# Patient Record
Sex: FEMALE | Race: WHITE | Employment: OTHER | URBAN - NONMETROPOLITAN AREA
[De-identification: names, ages, dates, MRNs, and addresses within clinical notes are randomized per-mention and may not be internally consistent; named-entity substitution may affect disease eponyms.]

---

## 2022-01-08 ENCOUNTER — APPOINTMENT (OUTPATIENT)
Dept: GENERAL RADIOLOGY | Age: 67
DRG: 917 | End: 2022-01-08
Payer: MEDICARE

## 2022-01-08 ENCOUNTER — HOSPITAL ENCOUNTER (INPATIENT)
Age: 67
LOS: 5 days | Discharge: PSYCHIATRIC OR SUBSTANCE USE FACILITY | DRG: 917 | End: 2022-01-13
Attending: EMERGENCY MEDICINE | Admitting: INTERNAL MEDICINE
Payer: MEDICARE

## 2022-01-08 ENCOUNTER — APPOINTMENT (OUTPATIENT)
Dept: CT IMAGING | Age: 67
DRG: 917 | End: 2022-01-08
Payer: MEDICARE

## 2022-01-08 DIAGNOSIS — J96.01 ACUTE RESPIRATORY FAILURE WITH HYPOXIA (HCC): ICD-10-CM

## 2022-01-08 DIAGNOSIS — T50.904A DRUG OVERDOSE, UNDETERMINED INTENT, INITIAL ENCOUNTER: Primary | ICD-10-CM

## 2022-01-08 DIAGNOSIS — J69.0 ASPIRATION PNEUMONIA, UNSPECIFIED ASPIRATION PNEUMONIA TYPE, UNSPECIFIED LATERALITY, UNSPECIFIED PART OF LUNG (HCC): ICD-10-CM

## 2022-01-08 PROBLEM — T50.902A INTENTIONAL DRUG OVERDOSE (HCC): Status: ACTIVE | Noted: 2022-01-08

## 2022-01-08 LAB
ACETAMINOPHEN LEVEL: <15 UG/ML
ACETAMINOPHEN LEVEL: <15 UG/ML
ALBUMIN SERPL-MCNC: 4.2 G/DL (ref 3.5–5.2)
ALP BLD-CCNC: 69 U/L (ref 35–104)
ALT SERPL-CCNC: 16 U/L (ref 5–33)
AMPHETAMINE SCREEN, URINE: NEGATIVE
ANION GAP SERPL CALCULATED.3IONS-SCNC: 12 MMOL/L (ref 7–19)
AST SERPL-CCNC: 29 U/L (ref 5–32)
BARBITURATE SCREEN URINE: NEGATIVE
BASE EXCESS ARTERIAL: 1.5 MMOL/L (ref -2–2)
BENZODIAZEPINE SCREEN, URINE: POSITIVE
BILIRUB SERPL-MCNC: 0.4 MG/DL (ref 0.2–1.2)
BILIRUBIN URINE: NEGATIVE
BLOOD, URINE: NEGATIVE
BUN BLDV-MCNC: 15 MG/DL (ref 8–23)
CALCIUM SERPL-MCNC: 10.1 MG/DL (ref 8.8–10.2)
CANNABINOID SCREEN URINE: NEGATIVE
CARBOXYHEMOGLOBIN ARTERIAL: 2.3 % (ref 0–5)
CHLORIDE BLD-SCNC: 105 MMOL/L (ref 98–111)
CLARITY: CLEAR
CO2: 25 MMOL/L (ref 22–29)
COCAINE METABOLITE SCREEN URINE: NEGATIVE
COLOR: ABNORMAL
CREAT SERPL-MCNC: 1 MG/DL (ref 0.5–0.9)
ETHANOL: <10 MG/DL (ref 0–0.08)
GFR AFRICAN AMERICAN: >59
GFR NON-AFRICAN AMERICAN: 56
GLUCOSE BLD-MCNC: 116 MG/DL (ref 70–99)
GLUCOSE BLD-MCNC: 139 MG/DL (ref 74–109)
GLUCOSE URINE: NEGATIVE MG/DL
HCO3 ARTERIAL: 25.9 MMOL/L (ref 22–26)
HCT VFR BLD CALC: 44.2 % (ref 37–47)
HEMOGLOBIN, ART, EXTENDED: 13.1 G/DL (ref 12–16)
HEMOGLOBIN: 13.9 G/DL (ref 12–16)
KETONES, URINE: ABNORMAL MG/DL
LEUKOCYTE ESTERASE, URINE: NEGATIVE
Lab: ABNORMAL
MCH RBC QN AUTO: 30.2 PG (ref 27–31)
MCHC RBC AUTO-ENTMCNC: 31.4 G/DL (ref 33–37)
MCV RBC AUTO: 96.1 FL (ref 81–99)
METHEMOGLOBIN ARTERIAL: 0.7 %
NITRITE, URINE: NEGATIVE
O2 CONTENT ARTERIAL: 16.9 ML/DL
O2 SAT, ARTERIAL: 92 %
O2 THERAPY: ABNORMAL
OPIATE SCREEN URINE: POSITIVE
PCO2 ARTERIAL: 39 MMHG (ref 35–45)
PDW BLD-RTO: 13.5 % (ref 11.5–14.5)
PERFORMED ON: ABNORMAL
PH ARTERIAL: 7.43 (ref 7.35–7.45)
PH UA: 5 (ref 5–8)
PLATELET # BLD: 275 K/UL (ref 130–400)
PMV BLD AUTO: 10.1 FL (ref 9.4–12.3)
PO2 ARTERIAL: 56 MMHG (ref 80–100)
POTASSIUM SERPL-SCNC: 4.5 MMOL/L (ref 3.5–5)
POTASSIUM SERPL-SCNC: 6.1 MMOL/L (ref 3.5–5)
POTASSIUM, WHOLE BLOOD: 4.6
PROTEIN UA: ABNORMAL MG/DL
RBC # BLD: 4.6 M/UL (ref 4.2–5.4)
SALICYLATE, SERUM: <3 MG/DL (ref 3–10)
SARS-COV-2, NAAT: NOT DETECTED
SODIUM BLD-SCNC: 142 MMOL/L (ref 136–145)
SPECIFIC GRAVITY UA: 1.04 (ref 1–1.03)
TOTAL PROTEIN: 6.9 G/DL (ref 6.6–8.7)
TROPONIN: <0.01 NG/ML (ref 0–0.03)
UROBILINOGEN, URINE: 0.2 E.U./DL
WBC # BLD: 12.5 K/UL (ref 4.8–10.8)

## 2022-01-08 PROCEDURE — 80307 DRUG TEST PRSMV CHEM ANLYZR: CPT

## 2022-01-08 PROCEDURE — 6360000002 HC RX W HCPCS: Performed by: INTERNAL MEDICINE

## 2022-01-08 PROCEDURE — 2000000000 HC ICU R&B

## 2022-01-08 PROCEDURE — 0BH17EZ INSERTION OF ENDOTRACHEAL AIRWAY INTO TRACHEA, VIA NATURAL OR ARTIFICIAL OPENING: ICD-10-PCS | Performed by: EMERGENCY MEDICINE

## 2022-01-08 PROCEDURE — 6360000002 HC RX W HCPCS

## 2022-01-08 PROCEDURE — 99285 EMERGENCY DEPT VISIT HI MDM: CPT

## 2022-01-08 PROCEDURE — 36600 WITHDRAWAL OF ARTERIAL BLOOD: CPT

## 2022-01-08 PROCEDURE — 82947 ASSAY GLUCOSE BLOOD QUANT: CPT

## 2022-01-08 PROCEDURE — 93005 ELECTROCARDIOGRAM TRACING: CPT | Performed by: EMERGENCY MEDICINE

## 2022-01-08 PROCEDURE — 82803 BLOOD GASES ANY COMBINATION: CPT

## 2022-01-08 PROCEDURE — 5A1945Z RESPIRATORY VENTILATION, 24-96 CONSECUTIVE HOURS: ICD-10-PCS | Performed by: EMERGENCY MEDICINE

## 2022-01-08 PROCEDURE — 2580000003 HC RX 258: Performed by: EMERGENCY MEDICINE

## 2022-01-08 PROCEDURE — 6370000000 HC RX 637 (ALT 250 FOR IP): Performed by: INTERNAL MEDICINE

## 2022-01-08 PROCEDURE — 80053 COMPREHEN METABOLIC PANEL: CPT

## 2022-01-08 PROCEDURE — 84484 ASSAY OF TROPONIN QUANT: CPT

## 2022-01-08 PROCEDURE — 2500000003 HC RX 250 WO HCPCS: Performed by: EMERGENCY MEDICINE

## 2022-01-08 PROCEDURE — 81003 URINALYSIS AUTO W/O SCOPE: CPT

## 2022-01-08 PROCEDURE — 84132 ASSAY OF SERUM POTASSIUM: CPT

## 2022-01-08 PROCEDURE — 87635 SARS-COV-2 COVID-19 AMP PRB: CPT

## 2022-01-08 PROCEDURE — 71045 X-RAY EXAM CHEST 1 VIEW: CPT

## 2022-01-08 PROCEDURE — 51702 INSERT TEMP BLADDER CATH: CPT

## 2022-01-08 PROCEDURE — 80179 DRUG ASSAY SALICYLATE: CPT

## 2022-01-08 PROCEDURE — 96375 TX/PRO/DX INJ NEW DRUG ADDON: CPT

## 2022-01-08 PROCEDURE — 36415 COLL VENOUS BLD VENIPUNCTURE: CPT

## 2022-01-08 PROCEDURE — 87040 BLOOD CULTURE FOR BACTERIA: CPT

## 2022-01-08 PROCEDURE — 31500 INSERT EMERGENCY AIRWAY: CPT

## 2022-01-08 PROCEDURE — 96365 THER/PROPH/DIAG IV INF INIT: CPT

## 2022-01-08 PROCEDURE — 2700000000 HC OXYGEN THERAPY PER DAY

## 2022-01-08 PROCEDURE — 96376 TX/PRO/DX INJ SAME DRUG ADON: CPT

## 2022-01-08 PROCEDURE — 6360000002 HC RX W HCPCS: Performed by: EMERGENCY MEDICINE

## 2022-01-08 PROCEDURE — 70450 CT HEAD/BRAIN W/O DYE: CPT

## 2022-01-08 PROCEDURE — 85027 COMPLETE CBC AUTOMATED: CPT

## 2022-01-08 PROCEDURE — 2580000003 HC RX 258: Performed by: INTERNAL MEDICINE

## 2022-01-08 PROCEDURE — 94002 VENT MGMT INPAT INIT DAY: CPT

## 2022-01-08 PROCEDURE — 82077 ASSAY SPEC XCP UR&BREATH IA: CPT

## 2022-01-08 PROCEDURE — 80143 DRUG ASSAY ACETAMINOPHEN: CPT

## 2022-01-08 PROCEDURE — 2500000003 HC RX 250 WO HCPCS: Performed by: INTERNAL MEDICINE

## 2022-01-08 RX ORDER — CHLORHEXIDINE GLUCONATE 0.12 MG/ML
15 RINSE ORAL 2 TIMES DAILY
Status: DISCONTINUED | OUTPATIENT
Start: 2022-01-08 | End: 2022-01-13 | Stop reason: HOSPADM

## 2022-01-08 RX ORDER — PROPOFOL 10 MG/ML
INJECTION, EMULSION INTRAVENOUS
Status: COMPLETED
Start: 2022-01-08 | End: 2022-01-08

## 2022-01-08 RX ORDER — ONDANSETRON 4 MG/1
4 TABLET, ORALLY DISINTEGRATING ORAL EVERY 8 HOURS PRN
Status: DISCONTINUED | OUTPATIENT
Start: 2022-01-08 | End: 2022-01-13 | Stop reason: HOSPADM

## 2022-01-08 RX ORDER — NALOXONE HYDROCHLORIDE 0.4 MG/ML
0.4 INJECTION, SOLUTION INTRAMUSCULAR; INTRAVENOUS; SUBCUTANEOUS ONCE
Status: COMPLETED | OUTPATIENT
Start: 2022-01-08 | End: 2022-01-08

## 2022-01-08 RX ORDER — SODIUM CHLORIDE 9 MG/ML
25 INJECTION, SOLUTION INTRAVENOUS PRN
Status: DISCONTINUED | OUTPATIENT
Start: 2022-01-08 | End: 2022-01-13 | Stop reason: HOSPADM

## 2022-01-08 RX ORDER — PROPOFOL 10 MG/ML
INJECTION, EMULSION INTRAVENOUS
Status: DISPENSED
Start: 2022-01-08 | End: 2022-01-08

## 2022-01-08 RX ORDER — SODIUM CHLORIDE 0.9 % (FLUSH) 0.9 %
5-40 SYRINGE (ML) INJECTION PRN
Status: DISCONTINUED | OUTPATIENT
Start: 2022-01-08 | End: 2022-01-13 | Stop reason: HOSPADM

## 2022-01-08 RX ORDER — NALOXONE HYDROCHLORIDE 0.4 MG/ML
INJECTION, SOLUTION INTRAMUSCULAR; INTRAVENOUS; SUBCUTANEOUS
Status: COMPLETED
Start: 2022-01-08 | End: 2022-01-08

## 2022-01-08 RX ORDER — ONDANSETRON 2 MG/ML
4 INJECTION INTRAMUSCULAR; INTRAVENOUS EVERY 6 HOURS PRN
Status: DISCONTINUED | OUTPATIENT
Start: 2022-01-08 | End: 2022-01-13 | Stop reason: HOSPADM

## 2022-01-08 RX ORDER — PROPOFOL 10 MG/ML
5-50 INJECTION, EMULSION INTRAVENOUS ONCE
Status: COMPLETED | OUTPATIENT
Start: 2022-01-08 | End: 2022-01-08

## 2022-01-08 RX ORDER — PROPOFOL 10 MG/ML
5-50 INJECTION, EMULSION INTRAVENOUS
Status: DISCONTINUED | OUTPATIENT
Start: 2022-01-08 | End: 2022-01-10

## 2022-01-08 RX ORDER — 0.9 % SODIUM CHLORIDE 0.9 %
1000 INTRAVENOUS SOLUTION INTRAVENOUS ONCE
Status: COMPLETED | OUTPATIENT
Start: 2022-01-08 | End: 2022-01-08

## 2022-01-08 RX ORDER — SODIUM CHLORIDE 0.9 % (FLUSH) 0.9 %
5-40 SYRINGE (ML) INJECTION EVERY 12 HOURS SCHEDULED
Status: DISCONTINUED | OUTPATIENT
Start: 2022-01-08 | End: 2022-01-13 | Stop reason: HOSPADM

## 2022-01-08 RX ADMIN — FAMOTIDINE 20 MG: 10 INJECTION, SOLUTION INTRAVENOUS at 20:04

## 2022-01-08 RX ADMIN — METRONIDAZOLE 500 MG: 500 SOLUTION INTRAVENOUS at 08:09

## 2022-01-08 RX ADMIN — PROPOFOL 10 MCG/KG/MIN: 10 INJECTION, EMULSION INTRAVENOUS at 19:42

## 2022-01-08 RX ADMIN — PROPOFOL 5 ML/HR: 10 INJECTION, EMULSION INTRAVENOUS at 09:13

## 2022-01-08 RX ADMIN — NALOXONE HYDROCHLORIDE 0.4 MG: 0.4 INJECTION, SOLUTION INTRAMUSCULAR; INTRAVENOUS; SUBCUTANEOUS at 07:30

## 2022-01-08 RX ADMIN — SODIUM CHLORIDE, PRESERVATIVE FREE 10 ML: 5 INJECTION INTRAVENOUS at 20:04

## 2022-01-08 RX ADMIN — FAMOTIDINE 20 MG: 10 INJECTION, SOLUTION INTRAVENOUS at 11:10

## 2022-01-08 RX ADMIN — PROPOFOL 10 MCG/KG/MIN: 10 INJECTION, EMULSION INTRAVENOUS at 11:12

## 2022-01-08 RX ADMIN — AZITHROMYCIN MONOHYDRATE 500 MG: 500 INJECTION, POWDER, LYOPHILIZED, FOR SOLUTION INTRAVENOUS at 09:15

## 2022-01-08 RX ADMIN — NALXONE HYDROCHLORIDE 0.4 MG: 0.4 INJECTION INTRAMUSCULAR; INTRAVENOUS; SUBCUTANEOUS at 06:38

## 2022-01-08 RX ADMIN — NALOXONE HYDROCHLORIDE 0.4 MG: 0.4 INJECTION, SOLUTION INTRAMUSCULAR; INTRAVENOUS; SUBCUTANEOUS at 06:38

## 2022-01-08 RX ADMIN — WATER 1000 MG: 1 INJECTION INTRAMUSCULAR; INTRAVENOUS; SUBCUTANEOUS at 08:08

## 2022-01-08 RX ADMIN — CHLORHEXIDINE GLUCONATE 15 ML: 1.2 RINSE ORAL at 20:00

## 2022-01-08 RX ADMIN — ENOXAPARIN SODIUM 40 MG: 100 INJECTION SUBCUTANEOUS at 11:10

## 2022-01-08 RX ADMIN — NALXONE HYDROCHLORIDE 0.4 MG: 0.4 INJECTION INTRAMUSCULAR; INTRAVENOUS; SUBCUTANEOUS at 07:30

## 2022-01-08 RX ADMIN — SODIUM CHLORIDE 1000 ML: 9 INJECTION, SOLUTION INTRAVENOUS at 06:47

## 2022-01-08 ASSESSMENT — PULMONARY FUNCTION TESTS
PIF_VALUE: 18
PIF_VALUE: 17
PIF_VALUE: 21
PIF_VALUE: 19
PIF_VALUE: 18
PIF_VALUE: 18
PIF_VALUE: 22
PIF_VALUE: 18
PIF_VALUE: 0
PIF_VALUE: 29
PIF_VALUE: 20
PIF_VALUE: 20
PIF_VALUE: 18
PIF_VALUE: 20
PIF_VALUE: 21
PIF_VALUE: 29
PIF_VALUE: 18
PIF_VALUE: 19
PIF_VALUE: 24
PIF_VALUE: 21
PIF_VALUE: 21

## 2022-01-08 NOTE — ED NOTES
Dr. Latrell Golden at bedside, as well as Shannan READ, Fernando Iverson, Juan Carlos Bishop, and Suzie LEDBETTER , as well as this RN         Pankaj Riley, RN  01/08/22 Renny Walker, RN  01/08/22 Renny Walker, RN  01/08/22 786

## 2022-01-08 NOTE — PROGRESS NOTES
Contains abnormal data BLOOD GAS, ARTERIAL     Status: Final result    Component Ref Range & Units 01/08/22 0720   pH, Arterial 7.350 - 7.450 7.430    pCO2, Arterial 35.0 - 45.0 mmHg 39.0    pO2, Arterial 80.0 - 100.0 mmHg 56. 0 Low     HCO3, Arterial 22.0 - 26.0 mmol/L 25.9    Base Excess, Arterial -2.0 - 2.0 mmol/L 1.5    Hemoglobin, Art, Extended 12.0 - 16.0 g/dL 13.1    O2 Sat, Arterial >92 % 92.0    Carboxyhgb, Arterial 0.0 - 5.0 % 2.3    Comment:      0.0-1.5   (Smokers 1.5-5.0)    Methemoglobin, Arterial <1.5 % 0.7    O2 Content, Arterial Not Established mL/dL 16.9    O2 Therapy  Unknown    Resulting Agency  [facility]        Specimen Collected: 01/08/22 0720 Last Resulted: 01/08/22 0721 View Other Order Details        Pt on 50% vmx for approx 5 min.   RR 24 site RB

## 2022-01-08 NOTE — ED NOTES
Remaining medication counted and taken with pt ICU for lock up. # 52 small, round, white pills. EP/205 on back. Security and pharm report they no longer secure pt's medications.      Cayla Graves RN  01/08/22 4269

## 2022-01-08 NOTE — PROGRESS NOTES
Pt intubated with 7.0 etube at 24 cm violet per . Pt placed on vent at AC/VC 15 450 100% 5 peep at 0849.  CXR and ABG's pending

## 2022-01-08 NOTE — ED NOTES
0844 etomidate 20 mg iv R wrist per stone liu    0845 succ 100 mg iv R wrist per stone liu    0845 pt hyperoxygenated via BVM, bagging easily, sat 97    0847 pt intubated by dr Brittni Cerrato, assisted by scotty dumont, with ETT 7, 24 at the lip. Bilateral breath sounds auscultated, good color change, sat 100, confirmed with portcxr.          Mata Rothman RN  01/08/22 2651

## 2022-01-08 NOTE — ED NOTES
Pt placed on Venti mask at 50% o2 at 10L - O2 sat up to 93%     Pankaj Riley, RN  01/08/22 2000 Britany Marshall RN  01/08/22 0522

## 2022-01-08 NOTE — ED NOTES
OFF nonrebreather per MD request, pt sats 82% W/I roughly 5 seconds         [facility], ANATOLY  01/08/22 [address] [place], [address] [place] [address]  01/08/22 3493

## 2022-01-08 NOTE — ED NOTES
Pt placed in Soft restraints to protect airway, pt pulling lines and tubes without restraints       ANATOLY Ruiz  01/08/22 4698

## 2022-01-08 NOTE — ED NOTES
EMS brought an unmarked bottle containing Ativan 1mg , possible overdose from this medicine - Ativan 1mg confirmed by Dr at [facility]       [facility]  01/08/22 [address] [facility],[address], [facility]  01/08/22 9292

## 2022-01-08 NOTE — ED NOTES
Dr Shelbie Faye, scotty rt, alexa rn, and mel rn at bedside for intubation.      Verena Sellers, ANATOLY  01/08/22 9962

## 2022-01-08 NOTE — CONSULTS
Comprehensive Nutrition Assessment    Type and Reason for Visit:  Initial,Consult    Nutrition Recommendations/Plan: start Vital High Protein at 15ml/hr and advance by 10ml every 6 hours until goal rate of 57ml is reached. Nutrition Assessment:  Pt appears adequately nourished AEB fat and muscle mass. Received consult for tf orders and management. Malnutrition Assessment:  Malnutrition Status:   At risk for malnutrition (Comment)    Context:  Acute Illness     Findings of the 6 clinical characteristics of malnutrition:  Energy Intake:  Mild decrease in energy intake (Comment)  Weight Loss:  No significant weight loss     Body Fat Loss:  No significant body fat loss     Muscle Mass Loss:  No significant muscle mass loss    Fluid Accumulation:  No significant fluid accumulation     Strength:  Not Performed    Estimated Daily Nutrient Needs:  Energy (kcal):  414-6024 kcls; Weight Used for Energy Requirements:  Admission     Protein (g):  118g; Weight Used for Protein Requirements:  Ideal        Fluid (ml/day):  997-1522 ml; Method Used for Fluid Requirements:  1 ml/kcal      Nutrition Related Findings:  on vent      Wounds:  None       Current Nutrition Therapies:    Current Tube Feeding (TF) Orders:  · Feeding Route: Orogastric  · Formula: Peptide Based High Protein  · Schedule: Continuous  · Additives/Modulars:    · Water Flushes: 10ml  · Current TF & Flush Orders Provides: 0  · Goal TF & Flush Orders Provides: Vital High Protein @ goal rate of 56ml/hr with 10ml free water flush = 1344 kcals with 117g protein, 149g CHO and 1363ml free water      Anthropometric Measures:  · Height: 5' 6\" (167.6 cm)  · Current Body Weight: 200 lb (90.7 kg)   · Admission Body Weight: 200 lb (90.7 kg)    · Usual Body Weight: 202 lb (91.6 kg) (10/2021)     · Ideal Body Weight: 130 lbs; % Ideal Body Weight 153.8 %   · BMI: 32.3  · Adjusted Body Weight:  ; No Adjustment   · Adjusted BMI:      · BMI Categories: Obese Class 1 (BMI 30.0-34. 9)       Nutrition Diagnosis:   · Inadequate oral intake related to acute injury/trauma,impaired respiratory function as evidenced by NPO or clear liquid status due to medical condition,intubation,nutrition support - enteral nutrition      Nutrition Interventions:   Food and/or Nutrient Delivery:  Start Tube Feeding  Nutrition Education/Counseling:  No recommendation at this time   Coordination of Nutrition Care:  Continue to monitor while inpatient    Goals:  meet nutritional needs through EN       Nutrition Monitoring and Evaluation:   Behavioral-Environmental Outcomes:  None Identified   Food/Nutrient Intake Outcomes:  Enteral Nutrition Intake/Tolerance  Physical Signs/Symptoms Outcomes:  Biochemical Data,Weight,Skin,Nutrition Focused Physical Findings,Fluid Status or Edema     Discharge Planning:     Too soon to determine     Electronically signed by Anastasia Huang MS, RD, LD on 1/8/22 at 2:56 PM CST    Contact: [phone]

## 2022-01-08 NOTE — ED PROVIDER NOTES
[facility] ICU  eMERGENCY dEPARTMENT eNCOUnter      Pt Name: Maya Shaikh  MRN: [id number]  [place] 1955  Date of evaluation: 1/8/2022  Provider: Sunita Verdin MD    CHIEF COMPLAINT       Chief Complaint   Patient presents with    Drug Overdose     reported ativan by EMS - called by          HISTORY OF PRESENT ILLNESS   (Location/Symptom, Timing/Onset,Context/Setting, Quality, Duration, Modifying Factors, Severity)  Note limiting factors. Maya Shaikh is a 73 y.o. female who presents to the emergency department due to suspected overdose. Report is that patient was normal last night and this morning  went to check on her and she was minimally responsive. There was a bottle of Ativan at the bedside that has been half-full and is now nearly empty. No further history available at this time. Patient's drowsy. Moans and screams anytime we try to examine her. Waiting for  to arrive for more information. HPI    NursingNotes were reviewed. REVIEW OF SYSTEMS    (2-9 systems for level 4, 10 or more for level 5)     Review of Systems   Unable to perform ROS: Mental status change       A complete review of systems was performed and is negative except as noted above in the HPI. PAST MEDICAL HISTORY   No past medical history on file. SURGICAL HISTORY     No past surgical history on file. CURRENT MEDICATIONS       There are no discharge medications for this patient. ALLERGIES     Patient has no allergy information on record. FAMILY HISTORY     No family history on file.        SOCIAL HISTORY       Social History     Socioeconomic History    Marital status:      Spouse name: Not on file    Number of children: Not on file    Years of education: Not on file    Highest education level: Not on file   Occupational History    Not on file   Tobacco Use    Smoking status: Not on file    Smokeless tobacco: Not on file   Substance and Sexual Activity    Alcohol use: Not on file    Drug use: Not on file    Sexual activity: Not on file   Other Topics Concern    Not on file   Social History Narrative    Not on file     Social Determinants of Health     Financial Resource Strain:     Difficulty of Paying Living Expenses: Not on file   Food Insecurity:     Worried About Running Out of Food in the Last Year: Not on file    Rosy of Food in the Last Year: Not on file   Transportation Needs:     Lack of Transportation (Medical): Not on file    Lack of Transportation (Non-Medical): Not on file   Physical Activity:     Days of Exercise per Week: Not on file    Minutes of Exercise per Session: Not on file   Stress:     Feeling of Stress : Not on file   Social Connections:     Frequency of Communication with Friends and Family: Not on file    Frequency of Social Gatherings with Friends and Family: Not on file    Attends Yazdanism Services: Not on file    Active Member of [address] [place] [address] [employer] or Organizations: Not on file    Attends Club or Organization Meetings: Not on file    Marital Status: Not on file   Intimate Partner Violence:     Fear of Current or Ex-Partner: Not on file    Emotionally Abused: Not on file    Physically Abused: Not on file    Sexually Abused: Not on file   Housing Stability:     Unable to Pay for Housing in the Last Year: Not on file    Number of [place] in the Last Year: Not on file    Unstable Housing in the Last Year: Not on file       SCREENINGS    [place] Coma Scale  Eye Opening: To pain  Best Verbal Response: None  Best Motor Response: Withdraws from pain  [place] Coma Scale Score: 7        PHYSICAL EXAM    (up to 7 for level 4, 8 or more for level 5)     ED Triage Vitals [01/08/22 0635]   BP Temp Temp Source Pulse Resp SpO2 Height Weight   (!) 69/49 98.4 °F (36.9 °C) Oral 73 29 95 % -- --       Physical Exam  Vitals reviewed. Constitutional:       Appearance: She is well-developed. She is not toxic-appearing.    HENT:      Head: Normocephalic and atraumatic. Right Ear: Tympanic membrane, ear canal and external ear normal.      Left Ear: Tympanic membrane, ear canal and external ear normal.      Mouth/Throat:      Mouth: Mucous membranes are moist.   Eyes:      General: No scleral icterus. Conjunctiva/sclera: Conjunctivae normal.      Pupils: Pupils are equal, round, and reactive to light. Neck:      Vascular: No JVD. Cardiovascular:      Rate and Rhythm: Normal rate and regular rhythm. Heart sounds: Normal heart sounds. Pulmonary:      Effort: Pulmonary effort is normal. No respiratory distress. Breath sounds: Normal breath sounds. Abdominal:      General: There is no distension. Palpations: Abdomen is soft. Tenderness: There is no abdominal tenderness. There is no guarding or rebound. Musculoskeletal:         General: No tenderness. Cervical back: Normal range of motion and neck supple. Skin:     General: Skin is warm and dry. Neurological:      Mental Status: She is easily aroused. She is lethargic. Comments: Limited exam but is moving all 4 extremities equally. Patient very restless in the bed and agitated but is also fairly lethargic. She is protecting airway adequately at this time. Psychiatric:      Comments: Unable to assess at this time         DIAGNOSTIC RESULTS     EKG: All EKG's are interpreted by the Emergency Department Physician who either signs or Co-signs this chart in the absence of a cardiologist.    Normal sinus rhythm. Artifact. Normal QT. Borderline T wave changes. RADIOLOGY:   Non-plain film images such as CT, Ultrasound and MRI are read by the radiologist. Salud Sax images are visualized and preliminarily interpreted by the emergency physician with the below findings:      Interpretation per the Radiologist below, if available at the time of this note:    XR CHEST PORTABLE   Final Result   1. Endotracheal tube is 1 cm proximal to the joão   2. Groundglass opacity right lung base slightly increased from prior   exam of the same date. Signed by Dr Jay Negro   Final Result   Mild cerebral and cerebellar volume loss with chronic microvascular   disease but no evidence of acute intracranial process. Signed by Dr Radha Mayfield   Final Result   1. Vague groundglass density right cardiophrenic angle. This may be   either an early inflammatory process or possibly atelectasis. .   Signed by Dr Deangelo Miner            ED BEDSIDE ULTRASOUND:   Performed by ED Physician - none    LABS:  Labs Reviewed   BLOOD GAS, ARTERIAL - Abnormal; Notable for the following components:       Result Value    pO2, Arterial 56.0 (*)     All other components within normal limits   CBC - Abnormal; Notable for the following components:    WBC 12.5 (*)     MCHC 31.4 (*)     All other components within normal limits   COMPREHENSIVE METABOLIC PANEL - Abnormal; Notable for the following components:    Potassium 6.1 (*)     Glucose 139 (*)     CREATININE 1.0 (*)     GFR Non- 56 (*)     All other components within normal limits    Narrative:     CALL  [place]  [facility] tel. ,  Chemistry results called to and read back by lui ritter er, 01/08/2022 07:39, by  [facility]   POCT GLUCOSE - Abnormal; Notable for the following components:    POC Glucose 116 (*)     All other components within normal limits   COVID-19, RAPID   CULTURE, BLOOD 2   CULTURE, BLOOD 1   ACETAMINOPHEN LEVEL   ETHANOL   URINE DRUG SCREEN   SALICYLATE LEVEL   TROPONIN   POTASSIUM, WHOLE BLOOD   POTASSIUM   URINALYSIS   ACETAMINOPHEN LEVEL       All other labs were within normal range or not returned as of this dictation.     EMERGENCY DEPARTMENT COURSE and DIFFERENTIALDIAGNOSIS/MDM:   Vitals:    Vitals:    01/08/22 0842 01/08/22 0843 01/08/22 0904 01/08/22 0945   BP:  (!) 164/77  135/87   Pulse:  77 76 71   Resp:   19 29   Temp:    98.5 °F (36.9 °C)   TempSrc:    Temporal SpO2:  97% 100% 100%   Weight: 200 lb (90.7 kg)      Height:    5' 6\" (1.676 m)       MDM  Number of Diagnoses or Management Options  Critical Care  Total time providing critical care: 30-74 minutes    Patient Progress  Patient progress: stable    Given Narcan soon after arrival and seems maybe a little bit more awake and agitated. O2 sats were low 80s on room air. 99% on 50% Ventimask. Initial BP 69/49 the patient was moving quite a bit. Repeat blood pressure 142/85. Patient's  is here now. He said that his wife had been upset last night about some family issues. He said that she had voiced suicidal ideation. He said that there was a bottle of Ativan that has quite a few more pills in the last night than it does this morning so he feels pretty confident she overdosed on these throughout the evening. No other ingestions he is aware of. He denies any history of her having other suicide attempts. Case discussed with Dr. Rhea Manuel, ICU hospitalist.  He is going to see the patient in the ER. Patient seems to protecting her airway adequately at this time. Could make a case to intubate but seems like she is probably okay to hold off on intubation for now. Does not seem like the Narcan really made any difference. Dr. Rhea Manuel is going to come and see the patient in the ER and then will decide whether or not he wants me to intubate her prior to her being admitted to the ICU. Before Dr. Rhea Manuel was able to come down patient seemed to become more sonorous and did vomit once. Because of this felt like intubation needed to prevent aspiration. See procedure note for details. Dr. Rhea Manuel in ER seeing patient and will admit to ICU.  updated about plan.     CONSULTS:  IP CONSULT TO DIETITIAN    PROCEDURES:  Unless otherwise notedbelow, none     Intubation    Date/Time: 1/8/2022 8:50 AM  Performed by: Marilu Bradford MD  Authorized by: Marilu Bradford MD     Consent: Consent obtained:  Emergent situation    Consent given by:  Spouse  Pre-procedure details:     Patient status:  Altered mental status    Mallampati score:  II    Pretreatment medications:  None    Paralytics:  Succinylcholine  Procedure details:     Preoxygenation:  Bag valve mask    CPR in progress: no      Intubation method:  Oral    Oral intubation technique:  Direct    Laryngoscope blade: Mac 4    Tube size (mm):  7.0    Tube type:  Cuffed    Number of attempts:  1    Cricoid pressure: yes      Tube visualized through cords: yes    Placement assessment:     ETT to lip:  23    Tube secured with:  ETT piña    Breath sounds:  Equal and absent over the epigastrium    Placement verification: chest rise, condensation, CXR verification, equal breath sounds, ETCO2 detector and tube exhalation      CXR findings:  ETT in proper place  Post-procedure details:     Patient tolerance of procedure: Tolerated well, no immediate complications        FINAL IMPRESSION     1. Drug overdose, undetermined intent, initial encounter    2. Aspiration pneumonia, unspecified aspiration pneumonia type, unspecified laterality, unspecified part of lung ([facility] [address]    3. Acute respiratory failure with hypoxia ([facility] [address]          DISPOSITION/PLAN   DISPOSITION Admitted 01/08/2022 08:45:36 AM      PATIENT REFERRED TO:  @FUP@    DISCHARGE MEDICATIONS:  There are no discharge medications for this patient.          (Please note that portions of this note were completed with a voice recognition program.  Efforts were made to edit the dictations butoccasionally words are mis-transcribed.)    Marilu Bradford MD (electronically signed)  AttendingEmergency Physician         Marilu Bradford MD  01/08/22 6115

## 2022-01-08 NOTE — ED NOTES
BG obtained at [address] [place] [address] , [facility]  01/08/22 8477

## 2022-01-09 LAB
ALBUMIN SERPL-MCNC: 3.3 G/DL (ref 3.5–5.2)
ALP BLD-CCNC: 57 U/L (ref 35–104)
ALT SERPL-CCNC: 10 U/L (ref 5–33)
ANION GAP SERPL CALCULATED.3IONS-SCNC: 10 MMOL/L (ref 7–19)
AST SERPL-CCNC: 11 U/L (ref 5–32)
BILIRUB SERPL-MCNC: 0.3 MG/DL (ref 0.2–1.2)
BUN BLDV-MCNC: 13 MG/DL (ref 8–23)
CALCIUM SERPL-MCNC: 8.7 MG/DL (ref 8.8–10.2)
CHLORIDE BLD-SCNC: 106 MMOL/L (ref 98–111)
CO2: 24 MMOL/L (ref 22–29)
CREAT SERPL-MCNC: 0.8 MG/DL (ref 0.5–0.9)
GFR AFRICAN AMERICAN: >59
GFR NON-AFRICAN AMERICAN: >60
GLUCOSE BLD-MCNC: 98 MG/DL (ref 74–109)
HCT VFR BLD CALC: 35.5 % (ref 37–47)
HEMOGLOBIN: 11.4 G/DL (ref 12–16)
MCH RBC QN AUTO: 30.2 PG (ref 27–31)
MCHC RBC AUTO-ENTMCNC: 32.1 G/DL (ref 33–37)
MCV RBC AUTO: 94.2 FL (ref 81–99)
PDW BLD-RTO: 13.3 % (ref 11.5–14.5)
PLATELET # BLD: 238 K/UL (ref 130–400)
PMV BLD AUTO: 10.5 FL (ref 9.4–12.3)
POTASSIUM REFLEX MAGNESIUM: 3.7 MMOL/L (ref 3.5–5)
RBC # BLD: 3.77 M/UL (ref 4.2–5.4)
SODIUM BLD-SCNC: 140 MMOL/L (ref 136–145)
TOTAL PROTEIN: 5.7 G/DL (ref 6.6–8.7)
WBC # BLD: 10.4 K/UL (ref 4.8–10.8)

## 2022-01-09 PROCEDURE — 2580000003 HC RX 258: Performed by: INTERNAL MEDICINE

## 2022-01-09 PROCEDURE — 2700000000 HC OXYGEN THERAPY PER DAY

## 2022-01-09 PROCEDURE — 80053 COMPREHEN METABOLIC PANEL: CPT

## 2022-01-09 PROCEDURE — 94003 VENT MGMT INPAT SUBQ DAY: CPT

## 2022-01-09 PROCEDURE — 2000000000 HC ICU R&B

## 2022-01-09 PROCEDURE — 2500000003 HC RX 250 WO HCPCS: Performed by: INTERNAL MEDICINE

## 2022-01-09 PROCEDURE — 94002 VENT MGMT INPAT INIT DAY: CPT

## 2022-01-09 PROCEDURE — 36415 COLL VENOUS BLD VENIPUNCTURE: CPT

## 2022-01-09 PROCEDURE — 85027 COMPLETE CBC AUTOMATED: CPT

## 2022-01-09 PROCEDURE — 6360000002 HC RX W HCPCS: Performed by: INTERNAL MEDICINE

## 2022-01-09 RX ADMIN — FAMOTIDINE 20 MG: 10 INJECTION, SOLUTION INTRAVENOUS at 08:54

## 2022-01-09 RX ADMIN — SODIUM CHLORIDE, PRESERVATIVE FREE 10 ML: 5 INJECTION INTRAVENOUS at 20:59

## 2022-01-09 RX ADMIN — ENOXAPARIN SODIUM 40 MG: 100 INJECTION SUBCUTANEOUS at 09:00

## 2022-01-09 RX ADMIN — CHLORHEXIDINE GLUCONATE 15 ML: 1.2 RINSE ORAL at 08:54

## 2022-01-09 RX ADMIN — FAMOTIDINE 20 MG: 10 INJECTION, SOLUTION INTRAVENOUS at 20:59

## 2022-01-09 ASSESSMENT — PULMONARY FUNCTION TESTS
PIF_VALUE: 20
PIF_VALUE: 21
PIF_VALUE: 19
PIF_VALUE: 23
PIF_VALUE: 22
PIF_VALUE: 22
PIF_VALUE: 20
PIF_VALUE: 21
PIF_VALUE: 20
PIF_VALUE: 21
PIF_VALUE: 22
PIF_VALUE: 23
PIF_VALUE: 21

## 2022-01-09 NOTE — PROGRESS NOTES
The patient's daughter, Edward Alexis, called to get an update on her condition. She said she would find a way to have the patient's living will/medical information and medications faxed to the ICU tomorrow.

## 2022-01-09 NOTE — PROGRESS NOTES
Hospitalist Progress Note    Patient:  Shayna Murry  YOB: 1955  Date of Service: 1/9/2022  MRN: [id number]   Acct: [de-identified]   Primary Care Physician: No primary care provider on file. Advance Directive: Full Code  Admit Date: 1/8/2022       Hospital Day: 1  Referring Provider: Fouzia Giron DO    Patient Seen, Chart, Consults, Notes, Labs, Radiology studies reviewed. Subjective:  Shayna Murry is a 73 y.o. female  whom we are following for Valium overdose, suicide attempt, hypoxemic respiratory failure. She did well overnight. Hemodynamics are stable. We have her on spontaneous breathing trial this morning. We should be able to get her extubated shortly. She is on assist-control rate of 15, tidal volume of 450, 5 of PEEP, FiO2 0.35. Allergies:  Patient has no known allergies.     Medicines:  Current Facility-Administered Medications   Medication Dose Route Frequency Provider Last Rate Last Admin    sodium chloride flush 0.9 % injection 5-40 mL  5-40 mL IntraVENous 2 times per day Fouzia Giron, DO   10 mL at 01/08/22 2004    sodium chloride flush 0.9 % injection 5-40 mL  5-40 mL IntraVENous PRN Fouzia Giron, DO        0.9 % sodium chloride infusion  25 mL IntraVENous PRN Fouzia Giron, DO        enoxaparin (LOVENOX) injection 40 mg  40 mg SubCUTAneous Q24H Fouzia Giron, DO   40 mg at 01/09/22 0900    ondansetron (ZOFRAN-ODT) disintegrating tablet 4 mg  4 mg Oral Q8H PRN Fouzia Giron, DO        Or    ondansetron [facility]) injection 4 mg  4 mg IntraVENous Q6H PRN Fouzia Giron, DO        chlorhexidine (PERIDEX) 0.12 % solution 15 mL  15 mL Mouth/Throat BID Fouzia Giron, DO   15 mL at 01/09/22 0854    famotidine (PEPCID) injection 20 mg  20 mg IntraVENous BID Fouzia Giron, DO   20 mg at 01/09/22 1575    propofol injection  5-50 mcg/kg/min IntraVENous Titrated Fouzia Giron, DO   Stopped at 01/09/22 6428       Past Medical History:  No past medical history on file. Past Surgical History:  No past surgical history on file. Family History  No family history on file. Social History  Social History     Socioeconomic History    Marital status:      Spouse name: Not on file    Number of children: Not on file    Years of education: Not on file    Highest education level: Not on file   Occupational History    Not on file   Tobacco Use    Smoking status: Not on file    Smokeless tobacco: Not on file   Substance and Sexual Activity    Alcohol use: Not on file    Drug use: Not on file    Sexual activity: Not on file   Other Topics Concern    Not on file   Social History Narrative    Not on file     Social Determinants of Health     Financial Resource Strain:     Difficulty of Paying Living Expenses: Not on file   Food Insecurity:     Worried About Running Out of Food in the Last Year: Not on file    Rosy of Food in the Last Year: Not on file   Transportation Needs:     Lack of Transportation (Medical): Not on file    Lack of Transportation (Non-Medical):  Not on file   Physical Activity:     Days of Exercise per Week: Not on file    Minutes of Exercise per Session: Not on file   Stress:     Feeling of Stress : Not on file   Social Connections:     Frequency of Communication with Friends and Family: Not on file    Frequency of Social Gatherings with Friends and Family: Not on file    Attends Mandaeism Services: Not on file    Active Member of Clubs or Organizations: Not on file    Attends Club or Organization Meetings: Not on file    Marital Status: Not on file   Intimate Partner Violence:     Fear of Current or Ex-Partner: Not on file    Emotionally Abused: Not on file    Physically Abused: Not on file    Sexually Abused: Not on file   Housing Stability:     Unable to Pay for Housing in the Last Year: Not on file    Number of [place] in the Last Year: Not on file    Unstable Housing in the Last Year: Not on file         Review of Systems:    Review of Systems   Unable to perform ROS: Intubated           Objective:  Blood pressure (!) 140/72, pulse 72, temperature 97.8 °F (36.6 °C), temperature source Temporal, resp. rate 16, height 5' 6\" (1.676 m), weight 205 lb 6.4 oz (93.2 kg), SpO2 95 %. Intake/Output Summary (Last 24 hours) at 1/9/2022 1043  Last data filed at 1/9/2022 0800  Gross per 24 hour   Intake 1489.71 ml   Output 895 ml   Net 594.71 ml       Physical Exam  Vitals and nursing note reviewed. Constitutional:       Appearance: She is ill-appearing. Interventions: She is intubated. HENT:      Head: Normocephalic and atraumatic. Right Ear: External ear normal.      Left Ear: External ear normal.      Nose: Nose normal.      Mouth/Throat:      Mouth: Mucous membranes are moist.   Eyes:      Conjunctiva/sclera: Conjunctivae normal.      Pupils: Pupils are equal, round, and reactive to light. Cardiovascular:      Rate and Rhythm: Normal rate and regular rhythm. Heart sounds: Normal heart sounds. Pulmonary:      Effort: Pulmonary effort is normal. She is intubated. Breath sounds: Normal breath sounds. Abdominal:      General: Abdomen is flat. Palpations: Abdomen is soft. Musculoskeletal:         General: Normal range of motion. Cervical back: Neck supple. No rigidity. No muscular tenderness. Skin:     General: Skin is warm and dry.          Labs:  BMP:   Recent Labs     01/08/22  0643 01/08/22  0720 01/08/22  0811 01/09/22  0209     --   --  140   K 6.1* 4.6 4.5 3.7     --   --  106   CO2 25  --   --  24   BUN 15  --   --  13   CREATININE 1.0*  --   --  0.8   CALCIUM 10.1  --   --  8.7*     CBC:   Recent Labs     01/08/22  0643 01/09/22  0209   WBC 12.5* 10.4   HGB 13.9 11.4*   HCT 44.2 35.5*   MCV 96.1 94.2    238     LIVER PROFILE:   Recent Labs     01/08/22  0643 01/09/22  0209   AST 29 11   ALT 16 10   BILITOT 0.4 0.3 ALKPHOS 69 57     PT/INR: No results for input(s): PROTIME, INR in the last 72 hours. APTT: No results for input(s): APTT in the last 72 hours. BNP:  No results for input(s): BNP in the last 72 hours. Ionized Calcium:No results for input(s): IONCA in the last 72 hours. Magnesium:No results for input(s): MG in the last 72 hours. Phosphorus:No results for input(s): PHOS in the last 72 hours. HgbA1C: No results for input(s): LABA1C in the last 72 hours. Hepatic:   Recent Labs     01/08/22  0643 01/09/22  0209   ALKPHOS 69 57   ALT 16 10   AST 29 11   PROT 6.9 5.7*   BILITOT 0.4 0.3   LABALBU 4.2 3.3*     Lactic Acid: No results for input(s): LACTA in the last 72 hours. Troponin: No results for input(s): CKTOTAL, CKMB, TROPONINT in the last 72 hours. ABGs: No results for input(s): PH, PCO2, PO2, HCO3, O2SAT in the last 72 hours. CRP:  No results for input(s): CRP in the last 72 hours. Sed Rate:  No results for input(s): SEDRATE in the last 72 hours. Cultures:   No results for input(s): CULTURE in the last 72 hours. Recent Labs     01/08/22  0811 01/08/22  0816   BC No Growth to date. Any change in status will be called. --    BLOODCULT2  --  No Growth to date. Any change in status will be called. No results for input(s): CXSURG in the last 72 hours. Radiology reports as per the Radiologist  Radiology: CT HEAD WO CONTRAST    Result Date: 1/8/2022  EXAMINATION: CT HEAD WO CONTRAST 1/8/2022 9:09 AM HISTORY: CT BRAIN without contrast 1/8/2022 7:56 AM HISTORY: Altered mental status COMPARISON: None DLP: 1188 mGy cm TECHNIQUE: Serial axial tomographic images of the brain were obtained without the use of intravenous contrast. FINDINGS: The midline structures are nondisplaced. There is mild cerebral and cerebellar volume loss, with an associated increase in the prominence of the ventricles and sulci. The basilar cisterns are normal in size and configuration.  There is no evidence of intracranial hemorrhage or mass-effect. There is low attenuation in the periventricular white matter, consistent with chronic ischemic change. There are no abnormal extra-axial fluid collections. There is no evidence of tonsillar herniation. The included orbits and their contents are unremarkable. The visualized paranasal sinuses, mastoid air cells and middle ear cavities are clear. The visualized osseous structures and overlying soft tissues of the skull and face are intact. Endotracheal tube is present. Mild cerebral and cerebellar volume loss with chronic microvascular disease but no evidence of acute intracranial process. Signed by Dr Josy Sands    XR CHEST PORTABLE    Result Date: 1/8/2022  EXAMINATION: XR CHEST PORTABLE 1/8/2022 10:09 AM HISTORY: XR CHEST PORTABLE 1/8/2022 8:53 AM HISTORY: Intubation COMPARISON: 6:53 AM same date. FINDINGS: Opacification the right lung base is noted better demonstrated on this exam. This may be minimally increased from prior exam of same date. Left lung is clear. . Cardiac silhouette is normal. Endotracheal tube is 1 cm proximal to the jooã. The nasogastric tube is satisfactorily positioned. The osseous structures and surrounding soft tissues demonstrate no acute abnormality. 1. Endotracheal tube is 1 cm proximal to the joão 2. Groundglass opacity right lung base slightly increased from prior exam of the same date. Signed by Dr Josy Sands    XR CHEST PORTABLE    Result Date: 1/8/2022  EXAMINATION: XR CHEST PORTABLE 1/8/2022 8:29 AM HISTORY: XR CHEST PORTABLE 1/8/2022 6:51 AM HISTORY: Drug overdose COMPARISON: None. FINDINGS: Vague groundglass opacities present in the right cardiophrenic angle. This may be atelectasis or possibly an early inflammatory process. Left lung is clear. . The cardiomediastinal silhouette and pulmonary vascularity are within normal limits. The osseous structures and surrounding soft tissues demonstrate no acute abnormality.     1. Vague groundglass density right cardiophrenic angle. This may be either an early inflammatory process or possibly atelectasis. . Signed by Dr James Lin     Intentional drug overdose. Supportive care. Psychiatry consult once medically stable.     Hypoxemic respiratory failure. Vent support.     Please document 39 minutes of critical care time for patient assessment, chart review, discussion with staff, .       Dellie Headings, DO

## 2022-01-09 NOTE — PROGRESS NOTES
Spoke to patients daughter Brisa Sutton. She states she doesn't feel that her dad Syeda Trivedi was forth coming about her moms admission. According to her both parents planned suicide and that is why they came to [facility]. She states that there were multiple suicide letters from them to different family members and then her dad Shy Pimentel) decided he didn't want to go through with it and that is when her mom was upset and took the ativan pills. Daughter was not aware of this until she went to her parents house after admission and found the letters.

## 2022-01-09 NOTE — PLAN OF CARE
Problem: Non-Violent Restraints  Goal: Removal from restraints as soon as assessed to be safe  1/8/2022 2322 by Odessa Jonas RN  Outcome: Ongoing  1/8/2022 2317 by Odessa Jonas RN  Outcome: Ongoing  Goal: No harm/injury to patient while restraints in use  1/8/2022 2322 by Odessa Jonas RN  Outcome: Ongoing  1/8/2022 2317 by Odessa Jonas RN  Outcome: Ongoing  Goal: Patient's dignity will be maintained  1/8/2022 2322 by Odessa Jonas RN  Outcome: Ongoing  1/8/2022 2317 by Odessa Jonas RN  Outcome: Ongoing     Problem: Skin Integrity:  Goal: Will show no infection signs and symptoms  Description: Will show no infection signs and symptoms  1/8/2022 2322 by Odessa Jonas RN  Outcome: Ongoing  1/8/2022 2317 by Odessa Jonas RN  Outcome: Ongoing  Goal: Absence of new skin breakdown  Description: Absence of new skin breakdown  1/8/2022 2322 by Odessa Jonas RN  Outcome: Ongoing  1/8/2022 2317 by Odessa Jonas RN  Outcome: Ongoing     Problem: Falls - Risk of:  Goal: Will remain free from falls  Description: Will remain free from falls  1/8/2022 2322 by Odessa Jonas RN  Outcome: Ongoing  1/8/2022 2317 by Odessa Jonas RN  Outcome: Ongoing  Goal: Absence of physical injury  Description: Absence of physical injury  1/8/2022 2322 by Odessa Jonas RN  Outcome: Ongoing  1/8/2022 2317 by Odessa Jonas RN  Outcome: Ongoing

## 2022-01-09 NOTE — H&P
[facility] Medicine H&P    Patient:  Baird Sandifer  MRN: [id number]    Consulting Physician: Shawna Ramirez DO  Reason for Consult: Medical Management  Primacy Care Physician: No primary care provider on file. HISTORY OF PRESENT ILLNESS:   The patient is a 73 y.o. female presents to ER with intentional overdose. She got into an argument with her  and took many Valium tablets. She required intubation for airway protection. Past Medical History:  No past medical history on file. Past Surgical History:  No past surgical history on file. Medications: Scheduled Meds:   sodium chloride flush  5-40 mL IntraVENous 2 times per day    enoxaparin  40 mg SubCUTAneous Q24H    chlorhexidine  15 mL Mouth/Throat BID    famotidine (PEPCID) injection  20 mg IntraVENous BID    propofol         Continuous Infusions:   sodium chloride      propofol 10 mcg/kg/min (01/08/22 1112)     PRN Meds:.sodium chloride flush, sodium chloride, ondansetron **OR** ondansetron    Allergies:  Patient has no known allergies. Social History:   TOBACCO:   has no history on file for tobacco use. ETOH:   has no history on file for alcohol use. Family History:   No family history on file. ROS:  Review of Systems   Unable to perform ROS: Intubated       Physical Exam:    Vitals: BP (!) 121/48   Pulse 64   Temp 97.6 °F (36.4 °C) (Temporal)   Resp 17   Ht 5' 6\" (1.676 m)   Wt 200 lb (90.7 kg)   SpO2 96%   BMI 32.28 kg/m²     Physical Exam  Vitals and nursing note reviewed. Constitutional:       Interventions: She is sedated and intubated. HENT:      Head: Normocephalic and atraumatic. Right Ear: External ear normal.      Left Ear: External ear normal.      Nose: Nose normal.      Mouth/Throat:      Mouth: Mucous membranes are moist.   Eyes:      Conjunctiva/sclera: Conjunctivae normal.      Pupils: Pupils are equal, round, and reactive to light.    Cardiovascular:      Rate and Rhythm: Normal rate and regular rhythm. Heart sounds: Normal heart sounds. Pulmonary:      Effort: Pulmonary effort is normal. She is intubated. Breath sounds: Normal breath sounds. Abdominal:      General: Abdomen is flat. Palpations: Abdomen is soft. Musculoskeletal:         General: Normal range of motion. Cervical back: Neck supple. No rigidity. No muscular tenderness. Skin:     General: Skin is warm and dry. CBC:   Recent Labs     01/08/22 0643   WBC 12.5*   HGB 13.9        BMP:    Recent Labs     01/08/22  0643 01/08/22  0720 01/08/22  0811     --   --    K 6.1* 4.6 4.5     --   --    CO2 25  --   --    BUN 15  --   --    CREATININE 1.0*  --   --    GLUCOSE 139*  --   --      Hepatic:   Recent Labs     01/08/22 0643   AST 29   ALT 16   BILITOT 0.4   ALKPHOS 69     Troponin:   Recent Labs     01/08/22 0643   TROPONINI <0.01     BNP: No results for input(s): BNP in the last 72 hours. Lipids: No results for input(s): CHOL, HDL in the last 72 hours. Invalid input(s): LDLCALCU  ABGs:   Lab Results   Component Value Date    PHART 7.430 01/08/2022    PO2ART 56.0 01/08/2022    BWC3OEE 39.0 01/08/2022     INR: No results for input(s): INR in the last 72 hours. -----------------------------------------------------------------  CT HEAD WO CONTRAST    Result Date: 1/8/2022  EXAMINATION: CT HEAD WO CONTRAST 1/8/2022 9:09 AM HISTORY: CT BRAIN without contrast 1/8/2022 7:56 AM HISTORY: Altered mental status COMPARISON: None DLP: 1188 mGy cm TECHNIQUE: Serial axial tomographic images of the brain were obtained without the use of intravenous contrast. FINDINGS: The midline structures are nondisplaced. There is mild cerebral and cerebellar volume loss, with an associated increase in the prominence of the ventricles and sulci. The basilar cisterns are normal in size and configuration. There is no evidence of intracranial hemorrhage or mass-effect.  There is low attenuation in the periventricular process or possibly atelectasis. . Signed by Dr Mary Grant and Plan     Intentional drug overdose. Supportive care. Hypoxemic respiratory failure. Vent support. Please document 35 minutes of critical care time for patient assessment, chart review, discussion with staff, .       Danyell Starks, DO

## 2022-01-10 LAB
ALBUMIN SERPL-MCNC: 3.3 G/DL (ref 3.5–5.2)
ALP BLD-CCNC: 62 U/L (ref 35–104)
ALT SERPL-CCNC: 11 U/L (ref 5–33)
ANION GAP SERPL CALCULATED.3IONS-SCNC: 9 MMOL/L (ref 7–19)
AST SERPL-CCNC: 17 U/L (ref 5–32)
BILIRUB SERPL-MCNC: 0.3 MG/DL (ref 0.2–1.2)
BUN BLDV-MCNC: 7 MG/DL (ref 8–23)
CALCIUM SERPL-MCNC: 8.8 MG/DL (ref 8.8–10.2)
CHLORIDE BLD-SCNC: 107 MMOL/L (ref 98–111)
CO2: 27 MMOL/L (ref 22–29)
CREAT SERPL-MCNC: 0.6 MG/DL (ref 0.5–0.9)
EKG P AXIS: 45 DEGREES
EKG P-R INTERVAL: 148 MS
EKG Q-T INTERVAL: 308 MS
EKG QRS DURATION: 80 MS
EKG QTC CALCULATION (BAZETT): 344 MS
EKG T AXIS: 9 DEGREES
GFR AFRICAN AMERICAN: >59
GFR NON-AFRICAN AMERICAN: >60
GLUCOSE BLD-MCNC: 88 MG/DL (ref 74–109)
HCT VFR BLD CALC: 35.1 % (ref 37–47)
HEMOGLOBIN: 11.7 G/DL (ref 12–16)
MCH RBC QN AUTO: 31 PG (ref 27–31)
MCHC RBC AUTO-ENTMCNC: 33.3 G/DL (ref 33–37)
MCV RBC AUTO: 92.9 FL (ref 81–99)
PDW BLD-RTO: 13 % (ref 11.5–14.5)
PLATELET # BLD: 243 K/UL (ref 130–400)
PMV BLD AUTO: 10.2 FL (ref 9.4–12.3)
POTASSIUM REFLEX MAGNESIUM: 3.6 MMOL/L (ref 3.5–5)
RBC # BLD: 3.78 M/UL (ref 4.2–5.4)
SODIUM BLD-SCNC: 143 MMOL/L (ref 136–145)
TOTAL PROTEIN: 6.2 G/DL (ref 6.6–8.7)
WBC # BLD: 8.7 K/UL (ref 4.8–10.8)

## 2022-01-10 PROCEDURE — 1210000000 HC MED SURG R&B

## 2022-01-10 PROCEDURE — 36415 COLL VENOUS BLD VENIPUNCTURE: CPT

## 2022-01-10 PROCEDURE — 2580000003 HC RX 258: Performed by: INTERNAL MEDICINE

## 2022-01-10 PROCEDURE — 85027 COMPLETE CBC AUTOMATED: CPT

## 2022-01-10 PROCEDURE — 6360000002 HC RX W HCPCS: Performed by: INTERNAL MEDICINE

## 2022-01-10 PROCEDURE — 99222 1ST HOSP IP/OBS MODERATE 55: CPT | Performed by: PSYCHIATRY & NEUROLOGY

## 2022-01-10 PROCEDURE — 2700000000 HC OXYGEN THERAPY PER DAY

## 2022-01-10 PROCEDURE — 80053 COMPREHEN METABOLIC PANEL: CPT

## 2022-01-10 PROCEDURE — 2500000003 HC RX 250 WO HCPCS: Performed by: INTERNAL MEDICINE

## 2022-01-10 PROCEDURE — 6370000000 HC RX 637 (ALT 250 FOR IP): Performed by: INTERNAL MEDICINE

## 2022-01-10 RX ORDER — AMLODIPINE BESYLATE 5 MG/1
5 TABLET ORAL DAILY
Status: DISCONTINUED | OUTPATIENT
Start: 2022-01-10 | End: 2022-01-13 | Stop reason: HOSPADM

## 2022-01-10 RX ADMIN — SODIUM CHLORIDE, PRESERVATIVE FREE 10 ML: 5 INJECTION INTRAVENOUS at 07:56

## 2022-01-10 RX ADMIN — ENOXAPARIN SODIUM 40 MG: 100 INJECTION SUBCUTANEOUS at 10:16

## 2022-01-10 RX ADMIN — CHLORHEXIDINE GLUCONATE 15 ML: 1.2 RINSE ORAL at 07:56

## 2022-01-10 RX ADMIN — FAMOTIDINE 20 MG: 10 INJECTION, SOLUTION INTRAVENOUS at 22:38

## 2022-01-10 RX ADMIN — SODIUM CHLORIDE, PRESERVATIVE FREE 10 ML: 5 INJECTION INTRAVENOUS at 22:38

## 2022-01-10 RX ADMIN — FAMOTIDINE 20 MG: 10 INJECTION, SOLUTION INTRAVENOUS at 07:56

## 2022-01-10 RX ADMIN — AMLODIPINE BESYLATE 5 MG: 5 TABLET ORAL at 10:16

## 2022-01-10 ASSESSMENT — ENCOUNTER SYMPTOMS
COLOR CHANGE: 0
BACK PAIN: 0
VOMITING: 0
DIARRHEA: 0
SHORTNESS OF BREATH: 0
COUGH: 0
NAUSEA: 0
VOICE CHANGE: 0
CONSTIPATION: 0
RHINORRHEA: 0

## 2022-01-10 NOTE — PLAN OF CARE
Nutrition Problem #1: Inadequate oral intake  Intervention: Food and/or Nutrient Delivery: Continue Current Diet  Nutritional Goals: New Goal: Nutritional needs will be met through good PO intake

## 2022-01-10 NOTE — PROGRESS NOTES
Hospitalist Progress Note    Patient:  Florence Meredith  YOB: 1955  Date of Service: 1/10/2022  MRN: [id number]   Acct: [de-identified]   Primary Care Physician: No primary care provider on file. Advance Directive: Full Code  Admit Date: 1/8/2022       Hospital Day: 2  Referring Provider: Lunette Litten, DO    Patient Seen, Chart, Consults, Notes, Labs, Radiology studies reviewed. Subjective:  Florence Meredith is a 73 y.o. female  whom we are following for Valium overdose, suicide attempt, hypoxemic respiratory failure. No new events. Hemodynamics are stable. We will asked psychiatry to see in transfer to the floor. Allergies:  Patient has no known allergies. Medicines:  Current Facility-Administered Medications   Medication Dose Route Frequency Provider Last Rate Last Admin    amLODIPine (NORVASC) tablet 5 mg  5 mg Oral Daily Lunette Litten, DO        sodium chloride flush 0.9 % injection 5-40 mL  5-40 mL IntraVENous 2 times per day Lunette Litten, DO   10 mL at 01/10/22 0756    sodium chloride flush 0.9 % injection 5-40 mL  5-40 mL IntraVENous PRN Lunette Litten, DO        0.9 % sodium chloride infusion  25 mL IntraVENous PRN Lunette Litten, DO        enoxaparin (LOVENOX) injection 40 mg  40 mg SubCUTAneous Q24H Lunette Litten, DO   40 mg at 01/09/22 0900    ondansetron (ZOFRAN-ODT) disintegrating tablet 4 mg  4 mg Oral Q8H PRN Lunette Litten, DO        Or    [facility]ron [facility]) injection 4 mg  4 mg IntraVENous Q6H PRN Lunette Litten, DO        chlorhexidine (PERIDEX) 0.12 % solution 15 mL  15 mL Mouth/Throat BID Neha Litten, DO   15 mL at 01/10/22 0756    famotidine (PEPCID) injection 20 mg  20 mg IntraVENous BID Neha Litten, DO   20 mg at 01/10/22 6006       Past Medical History:  No past medical history on file. Past Surgical History:  No past surgical history on file. Family History  No family history on file.     Social History  Social History     Socioeconomic History    Marital status:      Spouse name: Not on file    Number of children: Not on file    Years of education: Not on file    Highest education level: Not on file   Occupational History    Not on file   Tobacco Use    Smoking status: Not on file    Smokeless tobacco: Not on file   Substance and Sexual Activity    Alcohol use: Not on file    Drug use: Not on file    Sexual activity: Not on file   Other Topics Concern    Not on file   Social History Narrative    Not on file     Social Determinants of Health     Financial Resource Strain:     Difficulty of Paying Living Expenses: Not on file   Food Insecurity:     Worried About Running Out of Food in the Last Year: Not on file    Rosy of Food in the Last Year: Not on file   Transportation Needs:     Lack of Transportation (Medical): Not on file    Lack of Transportation (Non-Medical): Not on file   Physical Activity:     Days of Exercise per Week: Not on file    Minutes of Exercise per Session: Not on file   Stress:     Feeling of Stress : Not on file   Social Connections:     Frequency of Communication with Friends and Family: Not on file    Frequency of Social Gatherings with Friends and Family: Not on file    Attends Alevism Services: Not on file    Active Member of [address] [place] [address] [employer] or Organizations: Not on file    Attends Club or Organization Meetings: Not on file    Marital Status: Not on file   Intimate Partner Violence:     Fear of Current or Ex-Partner: Not on file    Emotionally Abused: Not on file    Physically Abused: Not on file    Sexually Abused: Not on file   Housing Stability:     Unable to Pay for Housing in the Last Year: Not on file    Number of [place] in the Last Year: Not on file    Unstable Housing in the Last Year: Not on file         Review of Systems:    Review of Systems   Constitutional: Negative for activity change and fatigue.    HENT: Negative for rhinorrhea and voice change. Eyes: Negative for visual disturbance. Respiratory: Negative for cough and shortness of breath. Cardiovascular: Negative for chest pain and leg swelling. Gastrointestinal: Negative for constipation, diarrhea, nausea and vomiting. Endocrine: Negative for polyuria. Genitourinary: Negative for difficulty urinating and dysuria. Musculoskeletal: Negative for arthralgias and back pain. Skin: Negative for color change. Allergic/Immunologic: Negative for immunocompromised state. Neurological: Negative for dizziness and headaches. Psychiatric/Behavioral: Positive for dysphoric mood and suicidal ideas. Negative for confusion. Objective:  Blood pressure (!) 167/71, pulse 66, temperature 97.7 °F (36.5 °C), temperature source Temporal, resp. rate 20, height 5' 6\" (1.676 m), weight 205 lb 6.4 oz (93.2 kg), SpO2 92 %. Intake/Output Summary (Last 24 hours) at 1/10/2022 0931  Last data filed at 1/10/2022 0600  Gross per 24 hour   Intake 16.55 ml   Output 1215 ml   Net -1198.45 ml       Physical Exam  Vitals and nursing note reviewed. HENT:      Head: Normocephalic and atraumatic. Right Ear: External ear normal.      Left Ear: External ear normal.      Nose: Nose normal.      Mouth/Throat:      Mouth: Mucous membranes are moist.   Eyes:      Conjunctiva/sclera: Conjunctivae normal.      Pupils: Pupils are equal, round, and reactive to light. Cardiovascular:      Rate and Rhythm: Normal rate and regular rhythm. Heart sounds: Normal heart sounds. Pulmonary:      Effort: Pulmonary effort is normal.      Breath sounds: Normal breath sounds. Abdominal:      General: Abdomen is flat. Palpations: Abdomen is soft. Musculoskeletal:         General: Normal range of motion. Cervical back: Neck supple. No rigidity. No muscular tenderness. Skin:     General: Skin is warm and dry.    Neurological:      Mental Status: She is alert and oriented to person, place, and time. Psychiatric:         Mood and Affect: Mood normal.         Labs:  BMP:   Recent Labs     01/08/22  0643 01/08/22  0720 01/08/22  0811 01/09/22  0209 01/10/22  0141     --   --  140 143   K 6.1*   < > 4.5 3.7 3.6     --   --  106 107   CO2 25  --   --  24 27   BUN 15  --   --  13 7*   CREATININE 1.0*  --   --  0.8 0.6   CALCIUM 10.1  --   --  8.7* 8.8    < > = values in this interval not displayed. CBC:   Recent Labs     01/08/22  0643 01/09/22  0209 01/10/22  0141   WBC 12.5* 10.4 8.7   HGB 13.9 11.4* 11.7*   HCT 44.2 35.5* 35.1*   MCV 96.1 94.2 92.9    238 243     LIVER PROFILE:   Recent Labs     01/08/22  0643 01/09/22  0209 01/10/22  0141   AST 29 11 17   ALT 16 10 11   BILITOT 0.4 0.3 0.3   ALKPHOS 69 57 62     PT/INR: No results for input(s): PROTIME, INR in the last 72 hours. APTT: No results for input(s): APTT in the last 72 hours. BNP:  No results for input(s): BNP in the last 72 hours. Ionized Calcium:No results for input(s): IONCA in the last 72 hours. Magnesium:No results for input(s): MG in the last 72 hours. Phosphorus:No results for input(s): PHOS in the last 72 hours. HgbA1C: No results for input(s): LABA1C in the last 72 hours. Hepatic:   Recent Labs     01/08/22  0643 01/09/22  0209 01/10/22  0141   ALKPHOS 69 57 62   ALT 16 10 11   AST 29 11 17   PROT 6.9 5.7* 6.2*   BILITOT 0.4 0.3 0.3   LABALBU 4.2 3.3* 3.3*     Lactic Acid: No results for input(s): LACTA in the last 72 hours. Troponin: No results for input(s): CKTOTAL, CKMB, TROPONINT in the last 72 hours. ABGs: No results for input(s): PH, PCO2, PO2, HCO3, O2SAT in the last 72 hours. CRP:  No results for input(s): CRP in the last 72 hours. Sed Rate:  No results for input(s): SEDRATE in the last 72 hours. Cultures:   No results for input(s): CULTURE in the last 72 hours. Recent Labs     01/08/22  0811 01/08/22  0816   BC No Growth to date. Any change in status will be called.   -- BLOODCULT2  --  No Growth to date. Any change in status will be called. No results for input(s): CXSURG in the last 72 hours. Radiology reports as per the Radiologist  Radiology: CT HEAD WO CONTRAST    Result Date: 1/8/2022  EXAMINATION: CT HEAD WO CONTRAST 1/8/2022 9:09 AM HISTORY: CT BRAIN without contrast 1/8/2022 7:56 AM HISTORY: Altered mental status COMPARISON: None DLP: 1188 mGy cm TECHNIQUE: Serial axial tomographic images of the brain were obtained without the use of intravenous contrast. FINDINGS: The midline structures are nondisplaced. There is mild cerebral and cerebellar volume loss, with an associated increase in the prominence of the ventricles and sulci. The basilar cisterns are normal in size and configuration. There is no evidence of intracranial hemorrhage or mass-effect. There is low attenuation in the periventricular white matter, consistent with chronic ischemic change. There are no abnormal extra-axial fluid collections. There is no evidence of tonsillar herniation. The included orbits and their contents are unremarkable. The visualized paranasal sinuses, mastoid air cells and middle ear cavities are clear. The visualized osseous structures and overlying soft tissues of the skull and face are intact. Endotracheal tube is present. Mild cerebral and cerebellar volume loss with chronic microvascular disease but no evidence of acute intracranial process. Signed by Dr Aicha Green    XR CHEST PORTABLE    Result Date: 1/8/2022  EXAMINATION: XR CHEST PORTABLE 1/8/2022 10:09 AM HISTORY: XR CHEST PORTABLE 1/8/2022 8:53 AM HISTORY: Intubation COMPARISON: 6:53 AM same date. FINDINGS: Opacification the right lung base is noted better demonstrated on this exam. This may be minimally increased from prior exam of same date. Left lung is clear. . Cardiac silhouette is normal. Endotracheal tube is 1 cm proximal to the joão. The nasogastric tube is satisfactorily positioned.  The osseous structures and surrounding soft tissues demonstrate no acute abnormality. 1. Endotracheal tube is 1 cm proximal to the joão 2. Groundglass opacity right lung base slightly increased from prior exam of the same date. Signed by Dr Eugene Phan    XR CHEST PORTABLE    Result Date: 1/8/2022  EXAMINATION: XR CHEST PORTABLE 1/8/2022 8:29 AM HISTORY: XR CHEST PORTABLE 1/8/2022 6:51 AM HISTORY: Drug overdose COMPARISON: None. FINDINGS: Vague groundglass opacities present in the right cardiophrenic angle. This may be atelectasis or possibly an early inflammatory process. Left lung is clear. . The cardiomediastinal silhouette and pulmonary vascularity are within normal limits. The osseous structures and surrounding soft tissues demonstrate no acute abnormality. 1. Vague groundglass density right cardiophrenic angle. This may be either an early inflammatory process or possibly atelectasis. . Signed by Dr Estrella Baldwin     Intentional drug overdose. Resolved. Psychiatry consult.     Hypoxemic respiratory failure. Resolved. Transfer out of ICU.     Please document 35 minutes of critical care time for patient assessment, chart review, discussion with staff, .         Sadie Rodriguez DO

## 2022-01-10 NOTE — PROGRESS NOTES
Comprehensive Nutrition Assessment    Type and Reason for Visit:  Reassess    Nutrition Assessment:  Pt has advanced to an oral diet. Pt will be going to behavioral health unit once medically stable. Will monitor intakes to determine if nutrition intervention is needed. Malnutrition Assessment:  Malnutrition Status: At risk for malnutrition (Comment)    Context:  Acute Illness     Findings of the 6 clinical characteristics of malnutrition:  Energy Intake:  Mild decrease in energy intake (Comment)  Weight Loss:  No significant weight loss     Body Fat Loss:  No significant body fat loss     Muscle Mass Loss:  No significant muscle mass loss    Fluid Accumulation:  No significant fluid accumulation     Strength:  Not Performed       Current Nutrition Therapies:    ADULT DIET; Regular    Anthropometric Measures:  · Height: 5' 6\" (167.6 cm)  · Current Body Weight: 205 lb (93 kg)     · Ideal Body Weight: 130 lbs  · BMI: 33.1  · BMI Categories: Obese Class 1 (BMI 30.0-34. 9)       Nutrition Diagnosis:   Inadequate oral intake related to psychological cause or life stress as evidenced by     Nutrition Interventions:   Food and/or Nutrient Delivery:  Continue Current Diet  Coordination of Nutrition Care:  Continue to monitor while inpatient    Goals:  New Goal: Nutritional needs will be met through good PO intake       Nutrition Monitoring and Evaluation:   Food/Nutrient Intake Outcomes:  Food and Nutrient Intake  Physical Signs/Symptoms Outcomes:  Biochemical Data,Weight,Skin,Nutrition Focused Physical Findings,Fluid Status or Edema     Electronically signed by Ana Ham MS, RD, LD on 1/10/22 at 1:42 PM CST    Contact: [phone]

## 2022-01-10 NOTE — CONSULTS
[facility]  Psychiatry Consult    Reason for Consult: Concern   Status post suicidal attempt    The primary source(s) of information include(s):  Documentation: patient's chart    The patient is a 73 y.o. female with unknown previous psychiatric history, who has been admitted to ICU secondary to overdose by prescribed benzodiazepine medication. Patient has been seen in ICU in her room with presence of one-to-one sitter. Patient was sleeping on her bed and was on 2 points wrist restraints. This provider tried to wake patient up multiple times, however, she was able to open her eyes for a moment and then immediately fell asleep again. It seems that patient is still under sedative effect of used benzodiazepine medication, she took with suicidal intention. Patient's chart has been reviewed. Patient has been admitted to ICU after intentional overdose by unknown amount of prescribed Valium. Her condition required intubation for airway protection. Patient has been extubated yesterday. Collateral information obtained from the patient's daughter indicated that patient and her  planned suicide together and wrote multiple suicidal letters to different family members and then patient's  decided that he does not want to commit suicide, so patient became upset and took benzodiazepine medication with suicidal intention. Patient's daughter stated that patient used Ativan pills. PSYCHIATRIC HISTORY:  Unable to assess    Allergies:  Patient has no known allergies. Mental Status  Unable to perform patient's mental status evaluation, as patient is still under the influence of the medication she used to overdose. Assessment  DSM 5 DIAGNOSIS:  Status post suicidal attempt by overdose of prescribed medications      Recommendations  1. Patient's chart has been reviewed.   Patient meets criteria for admission to psychiatric unit for full psychiatric evaluation and possible psychotropic medications management, due to recent suicidal attempt  2. Patient can be transferred to psychiatric unit when she is medically stable. Patient will need to be ambulatory, perform ADLs independently, and to be able to participate in group activities and her recovery.       Buck Casper MD

## 2022-01-11 LAB
ANION GAP SERPL CALCULATED.3IONS-SCNC: 14 MMOL/L (ref 7–19)
BASOPHILS ABSOLUTE: 0 K/UL (ref 0–0.2)
BASOPHILS RELATIVE PERCENT: 0.7 % (ref 0–1)
BUN BLDV-MCNC: 9 MG/DL (ref 8–23)
CALCIUM SERPL-MCNC: 9.2 MG/DL (ref 8.8–10.2)
CHLORIDE BLD-SCNC: 102 MMOL/L (ref 98–111)
CO2: 24 MMOL/L (ref 22–29)
CREAT SERPL-MCNC: 0.6 MG/DL (ref 0.5–0.9)
EOSINOPHILS ABSOLUTE: 0.2 K/UL (ref 0–0.6)
EOSINOPHILS RELATIVE PERCENT: 3.9 % (ref 0–5)
GFR AFRICAN AMERICAN: >59
GFR NON-AFRICAN AMERICAN: >60
GLUCOSE BLD-MCNC: 96 MG/DL (ref 74–109)
HCT VFR BLD CALC: 38.2 % (ref 37–47)
HEMOGLOBIN: 12.2 G/DL (ref 12–16)
IMMATURE GRANULOCYTES #: 0 K/UL
LYMPHOCYTES ABSOLUTE: 1.7 K/UL (ref 1.1–4.5)
LYMPHOCYTES RELATIVE PERCENT: 29.3 % (ref 20–40)
MCH RBC QN AUTO: 29.5 PG (ref 27–31)
MCHC RBC AUTO-ENTMCNC: 31.9 G/DL (ref 33–37)
MCV RBC AUTO: 92.5 FL (ref 81–99)
MONOCYTES ABSOLUTE: 0.5 K/UL (ref 0–0.9)
MONOCYTES RELATIVE PERCENT: 8.6 % (ref 0–10)
NEUTROPHILS ABSOLUTE: 3.4 K/UL (ref 1.5–7.5)
NEUTROPHILS RELATIVE PERCENT: 57.2 % (ref 50–65)
PDW BLD-RTO: 12.6 % (ref 11.5–14.5)
PLATELET # BLD: 268 K/UL (ref 130–400)
PMV BLD AUTO: 9.7 FL (ref 9.4–12.3)
POTASSIUM REFLEX MAGNESIUM: 3.7 MMOL/L (ref 3.5–5)
RBC # BLD: 4.13 M/UL (ref 4.2–5.4)
SODIUM BLD-SCNC: 140 MMOL/L (ref 136–145)
WBC # BLD: 5.9 K/UL (ref 4.8–10.8)

## 2022-01-11 PROCEDURE — 36415 COLL VENOUS BLD VENIPUNCTURE: CPT

## 2022-01-11 PROCEDURE — 92610 EVALUATE SWALLOWING FUNCTION: CPT

## 2022-01-11 PROCEDURE — 85025 COMPLETE CBC W/AUTO DIFF WBC: CPT

## 2022-01-11 PROCEDURE — 2500000003 HC RX 250 WO HCPCS: Performed by: INTERNAL MEDICINE

## 2022-01-11 PROCEDURE — 92522 EVALUATE SPEECH PRODUCTION: CPT

## 2022-01-11 PROCEDURE — 80048 BASIC METABOLIC PNL TOTAL CA: CPT

## 2022-01-11 PROCEDURE — 1210000000 HC MED SURG R&B

## 2022-01-11 PROCEDURE — 2580000003 HC RX 258: Performed by: INTERNAL MEDICINE

## 2022-01-11 PROCEDURE — 6360000002 HC RX W HCPCS: Performed by: INTERNAL MEDICINE

## 2022-01-11 RX ADMIN — SODIUM CHLORIDE, PRESERVATIVE FREE 10 ML: 5 INJECTION INTRAVENOUS at 09:22

## 2022-01-11 RX ADMIN — SODIUM CHLORIDE, PRESERVATIVE FREE 10 ML: 5 INJECTION INTRAVENOUS at 20:55

## 2022-01-11 RX ADMIN — ENOXAPARIN SODIUM 40 MG: 100 INJECTION SUBCUTANEOUS at 13:51

## 2022-01-11 RX ADMIN — FAMOTIDINE 20 MG: 10 INJECTION, SOLUTION INTRAVENOUS at 20:55

## 2022-01-11 RX ADMIN — FAMOTIDINE 20 MG: 10 INJECTION, SOLUTION INTRAVENOUS at 08:30

## 2022-01-11 NOTE — PROGRESS NOTES
[facility]      Progress Note    Patient:  Baird Sandifer  YOB: 1955  Date of Service: 1/11/2022  MRN: [id number]   Acct: [de-identified]   Primary Care Physician: No primary care provider on file. Advance Directive: Full Code  Admit Date: 1/8/2022       Hospital Day: 3    Portions of this note have been copied forward, however, updated to reflect the most current clinical status of this patient. CHIEF COMPLAINT altered mental status, SI attempt    SUBJECTIVE:  Ms. Anoop Clement was resting in bed this morning. Currently lethargic and unable to participate in exam.      [place] [address]:   The patient is a 58-year-old female with unknown past medical history who presented to [facility] ED with complaints of intentional overdose. Per review of chart patient took unknown amounts of Ativan and was brought to ED as she was minimally responsive on morning of admission. She required intubation for airway protection in ED. Patient was initially admitted to ICU for close monitoring. Patient was successfully extubated to 2 L nasal cannula on 1/9/2022. She was transferred out of ICU on 1/10/2022. Psychiatry was consulted, whom recommended inpatient admission to psychiatric unit for full psychiatric evaluation and possible psychotropic medication management once medically stable. Review of Systems   Unable to perform ROS: Mental status change        Objective:   VITALS:  BP (!) 144/72   Pulse 70   Temp 96.6 °F (35.9 °C) (Temporal)   Resp 16   Ht 5' 6\" (1.676 m)   Wt 205 lb 6.4 oz (93.2 kg)   SpO2 96%   BMI 33.15 kg/m²   24HR INTAKE/OUTPUT:    Intake/Output Summary (Last 24 hours) at 1/11/2022 0947  Last data filed at 1/10/2022 1200  Gross per 24 hour   Intake 0 ml   Output 60 ml   Net -60 ml         Physical Exam  Constitutional:       General: She is not in acute distress. Appearance: Normal appearance. She is not toxic-appearing or diaphoretic.    HENT:      Head: Normocephalic and atraumatic. Right Ear: External ear normal.      Left Ear: External ear normal.      Nose: Nose normal. No congestion or rhinorrhea. Mouth/Throat:      Mouth: Mucous membranes are moist.      Pharynx: Oropharynx is clear. Eyes:      General: No scleral icterus. Extraocular Movements: Extraocular movements intact. Conjunctiva/sclera: Conjunctivae normal.   Cardiovascular:      Rate and Rhythm: Normal rate and regular rhythm. Pulses: Normal pulses. Heart sounds: Normal heart sounds. No murmur heard. No friction rub. No gallop. Pulmonary:      Effort: Pulmonary effort is normal. No respiratory distress. Breath sounds: Normal breath sounds. No wheezing, rhonchi or rales. Abdominal:      General: Abdomen is flat. Bowel sounds are normal. There is no distension. Palpations: Abdomen is soft. Tenderness: There is no abdominal tenderness. Musculoskeletal:         General: No swelling. Normal range of motion. Cervical back: Normal range of motion and neck supple. Right lower leg: No edema. Left lower leg: No edema. Skin:     General: Skin is warm and dry. Coloration: Skin is not jaundiced. Findings: No erythema, lesion or rash. Neurological:      Mental Status: She is alert. She is disoriented. Cranial Nerves: No cranial nerve deficit. Sensory: No sensory deficit. Motor: No weakness.       Comments: lethargic            Medications:      sodium chloride        amLODIPine  5 mg Oral Daily    sodium chloride flush  5-40 mL IntraVENous 2 times per day    enoxaparin  40 mg SubCUTAneous Q24H    chlorhexidine  15 mL Mouth/Throat BID    famotidine (PEPCID) injection  20 mg IntraVENous BID     sodium chloride flush, sodium chloride, ondansetron **OR** ondansetron  Diet NPO Exceptions are: [employer] [facility]     Lab and other Data:     Recent Labs     01/09/22  0209 01/10/22  0141 01/11/22  0839   WBC 10.4 8.7 5.9   HGB 11.4* 11.7* 12.2   PLT 238 243 268     Recent Labs     01/09/22  0209 01/10/22  0141 01/11/22  0839    143 140   K 3.7 3.6 3.7    107 102   CO2 24 27 24   BUN 13 7* 9   CREATININE 0.8 0.6 0.6   GLUCOSE 98 88 96     Recent Labs     01/09/22  0209 01/10/22  0141   AST 11 17   ALT 10 11   BILITOT 0.3 0.3   ALKPHOS 57 62     UA:  Recent Labs     01/08/22  0954   COLORU DARK YELLOW*   PHUR 5.0   CLARITYU Clear   SPECGRAV 1.039   LEUKOCYTESUR Negative   UROBILINOGEN 0.2   BILIRUBINUR Negative   BLOODU Negative   GLUCOSEU Negative       RAD:     CT HEAD WO CONTRAST  Result Date: 1/8/2022    Mild cerebral and cerebellar volume loss with chronic microvascular disease but no evidence of acute intracranial process. Signed by Dr Knight Emilie      XR CHEST PORTABLE  Result Date: 1/8/2022    1. Endotracheal tube is 1 cm proximal to the joão 2. Groundglass opacity right lung base slightly increased from prior exam of the same date. Signed by Dr Knight Searcy      XR CHEST PORTABLE  Result Date: 1/8/2022    1. Vague groundglass density right cardiophrenic angle. This may be either an early inflammatory process or possibly atelectasis. . Signed by Dr Pedro Altamirano:    Chapito Donohue [[id number]] Collected: 01/08/22 0637   Order Status: Completed Specimen: Nasopharyngeal Swab Updated: 01/08/22 0756    SARS-CoV-2, NAAT Not Detected     Culture, Blood 1 [[id number]] Collected: 01/08/22 0811   Order Status: Completed Specimen: Blood Updated: 01/09/22 0914    Blood Culture, Routine No Growth to date.  Any change in status will be called. Culture, Blood 2 [[id number]] Collected: 01/08/22 0816   Order Status: Completed Specimen: Blood Updated: 01/09/22 0914    Culture, Blood 2 No Growth to date.  Any change in status will be called. Assessment/Plan   Active Problems:    Intentional drug overdose ([facility] [address]  Resolved Problems:    * No resolved hospital problems.  *      Active Problems:    Intentional drug overdose ([facility] [address]-    - Psychiatry recommended inpatient admission to psychiatric unit for full psychiatric evaluation and possible psychotropic medication management once medically stable    - Monitor on telemetry      Hypoxemic respiratory failure-    -Resolved,  currently on room air              DVT Prophylaxis: Lovenox       GI prophylaxis:  Pepcid     Discharge planning: To inpatient psychiatric unit once medically stable for DC        Further Orders per Clinical course/attending. Electronically signed by IKE Pierre CNP on 1/11/2022 at 9:47 AM       EMR Dragon/Transcription disclaimer:   Much of this encounter note is an electronic transcription/translation of spoken language to printed text.  The electronic translation of spoken language may permit erroneous, or at times, nonsensical words or phrases to be inadvertently transcribed; although attempts have made to review the note for such errors, some may still exist.

## 2022-01-11 NOTE — PROGRESS NOTES
Speech Language Pathology  Facility/Department: [address] SERVICES   CLINICAL BEDSIDE SWALLOW EVALUATION  SPEECH PRODUCTION EVALUATION     NAME: Raffy Galeano  : 1955  MRN: [id number]    ADMISSION DATE: 2022  ADMITTING DIAGNOSIS: has Intentional drug overdose ([address] on their problem list.    Date of Eval: 2022  Evaluating Therapist: JULIA Callejas    Current Diet level:  Regular solid consistency with thin liquids    Reason for Referral  Raffy Galeano was referred for a bedside swallow evaluation to assess the efficiency of her swallow function, identify signs and symptoms of aspiration and make recommendations regarding safe dietary consistencies, effective compensatory strategies, and safe eating environment. Impression  Assessed patient's swallowing function. Patient exhibited decreased oral prep, decreased oral transit, and sluggish laryngeal elevation for swallow airway protection. Patient exhibited degree of airway detection with delayed coughs noted following single ice chip trials and 1/2 teaspoon trials thin H2O presented by SLP. Did not observe swallow function with any additional consistency secondary to noted lethargy. At this time, would recommend NPO status. If patient receives mouth care prior to intake, okay for ice chips and swabs thin H2O for comfort. Will continue to follow. Thank you for this consult. Treatment Plan  Requires SLP Intervention: Yes     Recommended Diet and Intervention  Diet Solids Recommendation: NPO  Liquid Consistency Recommendation: NPO  Therapeutic Interventions: Patient/Family education;Oral Care; Therapeutic PO trials with SLP    Treatment/Goals  Timeframe for Short-term Goals: 1x/day for 3 days   Goal 1: Patient NPO. Goal 2: Patient staff will follow swallow safety recommendations. Goal 3: Patient will receive daily oral care, via staff, to decrease bacteria from the oral cavity.    Goal 4: Re-assessment of swallow function for potential PO intake. Goal 5: Monitor speech production. Goal 6: Cognitive-linguistic eval      General  Chart Reviewed: Yes  Behavior/Cognition: Patient appeared lethargic within short period of time. O2 Device: None (Room air)  Communication Observation: (Assessed patient's speech production. Patient exhibited decreased volume of speech, decreased labial movements, and slow, decreased lingual movements during verbalizations. SLP ranked functional intelligibility of speech for unfamiliar listeners at  30-40% in utterances with background noise present.)  Follows Directions: While delayed, patient demonstrated ability to follow simple 1 step commands. Dentition: Dentures  Patient Positioning: Upright in bed  Consistencies Administered: Ice chips; Thin - spoon     Oral Motor Examination   Labial ROM: (Decreased, bilaterally, during labial retraction trials and labial protrusion trials; decreased more on the right.)  Labial Strength: (Adequate during labial compression trials.)  Labial Coordination: (Slowed movements were noted.)  Lingual ROM: (Adequate during lingual extension trials with full point achieved; decreased during lingual elevation trials without use of accessory jaw movement; decreased movements noted bilaterally.)  Lingual Symmetry: (Deviation was noted, to the left, during lingual extension trials.)  Lingual Strength: (Decreased)  Lingual Coordination: (Slowed movements were noted.)     Assessed patient's swallowing function with the following observations noted:     Oral Phase  Mastication: Ice chips (Patient exhibited min vertical jaw movement at the front of the mouth during oral prep of ice chip trials presented by SLP.)  Oral Phase - Comment: Oral transit of ice chip trials and 1/2 teaspoon trials thin H2O, presented by SLP, primarily measured 1-2 seconds in length. It is noted that patient held ice and H2O at the front of the mouth and piece swallowed.      Pharyngeal Phase  Laryngeal Elevation: (Patient exhibited sluggish laryngeal elevation for swallow airway protection.)  Delayed Cough: Ice chips; Thin - spoon   Pharyngeal Phase - Comment: Patient exhibited degree of airway detection with delayed coughs noted following single ice chip trials and 1/2 teaspoon trials thin H2O presented by SLP. Did not observe swallow function with any additional consistency secondary to noted lethargy. At this time, would recommend NPO status. If patient receives mouth care prior to intake, okay for ice chips and swabs thin H2O for comfort. Will continue to follow.     Electronically signed by JULIA Harden on 1/11/2022 at 10:21 AM

## 2022-01-12 LAB
ANION GAP SERPL CALCULATED.3IONS-SCNC: 15 MMOL/L (ref 7–19)
BASOPHILS ABSOLUTE: 0.1 K/UL (ref 0–0.2)
BASOPHILS RELATIVE PERCENT: 0.8 % (ref 0–1)
BUN BLDV-MCNC: 12 MG/DL (ref 8–23)
CALCIUM SERPL-MCNC: 9.5 MG/DL (ref 8.8–10.2)
CHLORIDE BLD-SCNC: 104 MMOL/L (ref 98–111)
CO2: 23 MMOL/L (ref 22–29)
CREAT SERPL-MCNC: 0.7 MG/DL (ref 0.5–0.9)
EOSINOPHILS ABSOLUTE: 0.2 K/UL (ref 0–0.6)
EOSINOPHILS RELATIVE PERCENT: 2.6 % (ref 0–5)
GFR AFRICAN AMERICAN: >59
GFR NON-AFRICAN AMERICAN: >60
GLUCOSE BLD-MCNC: 75 MG/DL (ref 74–109)
HCT VFR BLD CALC: 40.9 % (ref 37–47)
HEMOGLOBIN: 13 G/DL (ref 12–16)
IMMATURE GRANULOCYTES #: 0 K/UL
LYMPHOCYTES ABSOLUTE: 1.9 K/UL (ref 1.1–4.5)
LYMPHOCYTES RELATIVE PERCENT: 29.7 % (ref 20–40)
MCH RBC QN AUTO: 29.2 PG (ref 27–31)
MCHC RBC AUTO-ENTMCNC: 31.8 G/DL (ref 33–37)
MCV RBC AUTO: 91.9 FL (ref 81–99)
MONOCYTES ABSOLUTE: 0.5 K/UL (ref 0–0.9)
MONOCYTES RELATIVE PERCENT: 8.3 % (ref 0–10)
NEUTROPHILS ABSOLUTE: 3.8 K/UL (ref 1.5–7.5)
NEUTROPHILS RELATIVE PERCENT: 58 % (ref 50–65)
PDW BLD-RTO: 12.5 % (ref 11.5–14.5)
PLATELET # BLD: 340 K/UL (ref 130–400)
PMV BLD AUTO: 10.1 FL (ref 9.4–12.3)
POTASSIUM REFLEX MAGNESIUM: 4.3 MMOL/L (ref 3.5–5)
RBC # BLD: 4.45 M/UL (ref 4.2–5.4)
SODIUM BLD-SCNC: 142 MMOL/L (ref 136–145)
WBC # BLD: 6.5 K/UL (ref 4.8–10.8)

## 2022-01-12 PROCEDURE — 2580000003 HC RX 258: Performed by: INTERNAL MEDICINE

## 2022-01-12 PROCEDURE — 6360000002 HC RX W HCPCS: Performed by: INTERNAL MEDICINE

## 2022-01-12 PROCEDURE — 97110 THERAPEUTIC EXERCISES: CPT

## 2022-01-12 PROCEDURE — 6370000000 HC RX 637 (ALT 250 FOR IP): Performed by: INTERNAL MEDICINE

## 2022-01-12 PROCEDURE — 97162 PT EVAL MOD COMPLEX 30 MIN: CPT

## 2022-01-12 PROCEDURE — 2500000003 HC RX 250 WO HCPCS: Performed by: INTERNAL MEDICINE

## 2022-01-12 PROCEDURE — 92507 TX SP LANG VOICE COMM INDIV: CPT

## 2022-01-12 PROCEDURE — 36415 COLL VENOUS BLD VENIPUNCTURE: CPT

## 2022-01-12 PROCEDURE — 97165 OT EVAL LOW COMPLEX 30 MIN: CPT

## 2022-01-12 PROCEDURE — 97535 SELF CARE MNGMENT TRAINING: CPT

## 2022-01-12 PROCEDURE — 92526 ORAL FUNCTION THERAPY: CPT

## 2022-01-12 PROCEDURE — 80048 BASIC METABOLIC PNL TOTAL CA: CPT

## 2022-01-12 PROCEDURE — 85025 COMPLETE CBC W/AUTO DIFF WBC: CPT

## 2022-01-12 PROCEDURE — 1210000000 HC MED SURG R&B

## 2022-01-12 RX ORDER — ONDANSETRON 4 MG/1
4 TABLET, ORALLY DISINTEGRATING ORAL EVERY 8 HOURS PRN
Status: CANCELLED | OUTPATIENT
Start: 2022-01-12

## 2022-01-12 RX ORDER — ONDANSETRON 2 MG/ML
4 INJECTION INTRAMUSCULAR; INTRAVENOUS EVERY 6 HOURS PRN
Status: CANCELLED | OUTPATIENT
Start: 2022-01-12

## 2022-01-12 RX ORDER — SODIUM CHLORIDE 0.9 % (FLUSH) 0.9 %
5-40 SYRINGE (ML) INJECTION EVERY 12 HOURS SCHEDULED
Status: CANCELLED | OUTPATIENT
Start: 2022-01-12

## 2022-01-12 RX ORDER — SODIUM CHLORIDE 9 MG/ML
25 INJECTION, SOLUTION INTRAVENOUS PRN
Status: CANCELLED | OUTPATIENT
Start: 2022-01-12

## 2022-01-12 RX ORDER — SODIUM CHLORIDE 0.9 % (FLUSH) 0.9 %
5-40 SYRINGE (ML) INJECTION PRN
Status: CANCELLED | OUTPATIENT
Start: 2022-01-12

## 2022-01-12 RX ORDER — CHLORHEXIDINE GLUCONATE 0.12 MG/ML
15 RINSE ORAL 2 TIMES DAILY
Status: CANCELLED | OUTPATIENT
Start: 2022-01-12

## 2022-01-12 RX ORDER — AMLODIPINE BESYLATE 5 MG/1
5 TABLET ORAL DAILY
Status: CANCELLED | OUTPATIENT
Start: 2022-01-12

## 2022-01-12 RX ADMIN — AMLODIPINE BESYLATE 5 MG: 5 TABLET ORAL at 10:59

## 2022-01-12 RX ADMIN — SODIUM CHLORIDE, PRESERVATIVE FREE 10 ML: 5 INJECTION INTRAVENOUS at 11:01

## 2022-01-12 RX ADMIN — ENOXAPARIN SODIUM 40 MG: 100 INJECTION SUBCUTANEOUS at 10:59

## 2022-01-12 RX ADMIN — SODIUM CHLORIDE, PRESERVATIVE FREE 10 ML: 5 INJECTION INTRAVENOUS at 22:39

## 2022-01-12 RX ADMIN — FAMOTIDINE 20 MG: 10 INJECTION, SOLUTION INTRAVENOUS at 20:03

## 2022-01-12 RX ADMIN — FAMOTIDINE 20 MG: 10 INJECTION, SOLUTION INTRAVENOUS at 10:59

## 2022-01-12 ASSESSMENT — ENCOUNTER SYMPTOMS
BLOOD IN STOOL: 0
NAUSEA: 0
ABDOMINAL PAIN: 0
COUGH: 0
DIARRHEA: 0
SINUS PAIN: 0
SHORTNESS OF BREATH: 0
CONSTIPATION: 0
VOMITING: 0
TROUBLE SWALLOWING: 0
SORE THROAT: 0
ABDOMINAL DISTENTION: 0
WHEEZING: 0

## 2022-01-12 ASSESSMENT — PAIN SCALES - WONG BAKER
WONGBAKER_NUMERICALRESPONSE: 0
WONGBAKER_NUMERICALRESPONSE: 0

## 2022-01-12 NOTE — PROGRESS NOTES
Speech Language Pathology  Facility/Department: [facility] SURG SERVICES  SWALLOW THERAPY  SPEECH THERAPY     NAME: Anam Delcid  : 1955  MRN: [id number]    ADMISSION DATE: 2022  ADMITTING DIAGNOSIS: has Intentional drug overdose ([address] on their problem list.    Date of Treat: 2022  Evaluating Therapist: JULIA Driscoll    Current Diet level:  NPO    Reason for Referral  Anam Delcid was referred for a bedside swallow evaluation to assess the efficiency of her swallow function, identify signs and symptoms of aspiration and make recommendations regarding safe dietary consistencies, effective compensatory strategies, and safe eating environment. Impression  Re-assessed patient's swallowing function. Patient exhibited decreased oral prep of more solid consistencies, fast oral transit and suspected swallow delay with even thickened liquid consistencies, and sluggish laryngeal elevation for swallow airway protection. No outward S/S penetration/aspiration was noted with any ice chip trial, puree consistency trial, regular solid consistency trial, or moderately thick/honey thick liquid trial presented during treatment session this date. Patient did exhibit S/S penetration/aspiration with mildly thick/nectar thick liquid trials with mild delayed throat clears observed post swallows. At this time, would trial soft and bite sized consistency with moderately thick/honey thick liquids. Recommend meds crushed in pudding/applesauce. If patient receives mouth care prior to intake, okay for ice chips IN BETWEEN MEALS for comfort. Will continue to follow.     Treatment Plan  Requires SLP Intervention:  Yes     Recommended Diet and Intervention  Diet Solids Recommendation: Soft and bite sized  Liquid Consistency Recommendation: Moderately thick (honey)  Medication Administration Recommendation: Meds crushed in pudding/applesauce  Therapeutic Interventions: Patient/Family education;Oral Care;Diet Tolerance Monitoring; Therapeutic PO trials with SLP     Treatment/Goals  Timeframe for Short-term Goals: 1x/day for 3 days   Goal 1: Patient will tolerate soft and bite sized consistency and moderately thick/honey thick liquids with min S/S penetration/aspiration during PO intake. Goal 2: Patient staff will follow swallow safety recommendations to decrease risk of penetration/aspiration during PO intake. Goal 3: Patient will receive daily oral care, via staff, to decrease bacteria from the oral cavity. Goal 4: Re-assessment of swallow function for potential diet upgrade. Goal 5: Monitor speech production. Goal 6: Cognitive-linguistic eval      General  Chart Reviewed: Yes  Behavior/Cognition: Patient appeared lethargic within short period of time. O2 Device: None (Room air)  Communication Observation: (Re-assessed patient's speech production. Patient exhibited slow, decreased lingual movements during verbalizations. SLP ranked functional intelligibility of speech for unfamiliar listeners at 80-90% in utterances with background noise present.)  Follows Directions: While delayed, patient demonstrated ability to follow simple 1 step commands. Dentition: Dentures  Patient Positioning: Upright in bed  Consistencies Administered: Ice chips;Puree;Regular solid; Honey - cup;Nectar - cup      Oral Motor Examination   Labial ROM: (Decreased, on the right, during labial protrusion trials.)  Labial Strength: (Adequate during labial compression trials.)  Labial Coordination: (Adequate movements were noted.)  Lingual ROM: (Adequate during lingual extension trials with full point achieved; decreased during lingual elevation trials without use of accessory jaw movement; adequate movements noted bilaterally.)  Lingual Symmetry: (Deviation was noted, to the left, during lingual extension trials.)  Lingual Strength: (Decreased)  Lingual Coordination: (Slowed movements were noted.)     Re-assessed patient's swallowing function with the following observations noted:     Oral Phase  Mastication: Ice chips;Regular solid (Patient exhibited decreased rotary jaw movement during oral prep of ice chip trials and regular solid consistency trials all presented by SLP.)  Suspected Premature Bolus Loss: Nectar - cup (Patient exhibited fast oral transit of nectar thick liquid trials presented via cup by SLP.)  Oral Phase - Comment: Oral transit of ice chip trials primarily measured 1-2 seconds in length. Oral transit of puree consistency trials and regular solid consistency trials, all presented by SLP, primarily measured 1-2 seconds in length. No puree consistency residue was noted post swallows. Min regular solid consistency residue was observed post swallows; residue cleared from the mouth with additional dry swallows. Oral transit of honey thick liquid trials, presented via cup by SLP, primarily measured 1-2 seconds in length.      Pharyngeal Phase  Suspected Swallow Delay: Nectar - cup   Laryngeal Elevation: (Patient exhibited sluggish laryngeal elevation for swallow airway protection.)  Delayed Throat Clear: Nectar - cup   Pharyngeal Phase - Comment:  No outward S/S penetration/aspiration was noted with any ice chip trial, puree consistency trial, regular solid consistency trial, or moderately thick/honey thick liquid trial presented during treatment session this date. Patient did exhibit S/S penetration/aspiration with mildly thick/nectar thick liquid trials with mild delayed throat clears observed post swallows. At this time, would trial soft and bite sized consistency with moderately thick/honey thick liquids. Recommend meds crushed in pudding/applesauce. If patient receives mouth care prior to intake, okay for ice chips IN BETWEEN MEALS for comfort. Will continue to follow.     Electronically signed by JULIA Piña on 1/12/2022 at 12:11 PM

## 2022-01-12 NOTE — DISCHARGE SUMMARY
[facility] Hospitalists    Discharge Summary      Loan Notice  :  1955  MRN:  [id number]    Admit date:  2022  Discharge date:    2022    Discharging Physician:  Dr. Crys Epstein     Advance Directive: Full Code    Consults: psychiatry    Primary Care Physician:  No primary care provider on file. Discharge Diagnoses: Active Problems:    Intentional drug overdose ([address]  Resolved Problems:    * No resolved hospital problems. *      Portions of this note have been copied forward, however, changed to reflect the most current clinical status of this patient. Hospital Course: The patient is a 43-year-old female with unknown past medical history who presented to [address] [place] [address] ED with complaints of intentional overdose. Per review of chart patient took unknown amounts of Ativan and was brought to ED as she was minimally responsive on morning of admission. She required intubation for airway protection in ED. Patient was initially admitted to ICU for close monitoring. Patient was successfully extubated to 2 L nasal cannula on 2022. She was transferred out of ICU on 1/10/2022. Psychiatry was consulted, whom recommended inpatient admission to psychiatric unit for full psychiatric evaluation and possible psychotropic medication management once medically stable. Patient much more alert oriented today. Denied previous SI attempt. Currently denies HI or SI at this time. She is currently on room air maintaining adequate saturation. Patient is currently in stable condition to be discharged to inpatient psychiatric unit. Significant Diagnostic Studies:     CT HEAD WO CONTRAST  Result Date: 2022    Mild cerebral and cerebellar volume loss with chronic microvascular disease but no evidence of acute intracranial process. Signed by Dr Angie Veliz      XR CHEST PORTABLE  Result Date: 2022    1. Endotracheal tube is 1 cm proximal to the joão 2.  Groundglass opacity right lung base slightly increased from prior exam of the same date. Signed by Dr Servando Landeros      XR CHEST PORTABLE  Result Date: 1/8/2022    1. Vague groundglass density right cardiophrenic angle. This may be either an early inflammatory process or possibly atelectasis. . Signed by Dr Servando Landeros      Pertinent Labs:   CBC:   Recent Labs     01/10/22  0141 01/11/22  0839 01/12/22  0346   WBC 8.7 5.9 6.5   HGB 11.7* 12.2 13.0    268 340     BMP:    Recent Labs     01/10/22  0141 01/11/22  0839 01/12/22  0346    140 142   K 3.6 3.7 4.3    102 104   CO2 27 24 23   BUN 7* 9 12   CREATININE 0.6 0.6 0.7   GLUCOSE 88 96 75       Physical Exam:   Vital Signs: /67   Pulse 88   Temp 96.2 °F (35.7 °C) (Temporal)   Resp 17   Ht 5' 6\" (1.676 m)   Wt 205 lb 6.4 oz (93.2 kg)   SpO2 96%   BMI 33.15 kg/m²   General appearance:. Alert and Cooperative   HEENT: Normocephalic. Chest: Lung sounds clear bilaterally without wheezes or rhonchi. Cardiac: RRR, S1, S2 normal. No murmurs, gallops, or rubs auscultated. Abdomen: soft, non-tender; non-distended normal bowel sounds no masses, no organomegaly. Extremities: No clubbing or cyanosis. No peripheral edema. Peripheral pulses palpable. Neurologic: Grossly intact. Discharge Medications:       Final medication reconciliation to be completed upon discharge from inpatient psychiatric unit. Discharge Instructions: Follow up with PCP in 7 days   After being discharged from inpatient psychiatric unit. Take medications as directed. Resume activity as tolerated. Diet: ADULT DIET; Dysphagia - Soft and Bite Sized; Moderately Thick (Honey)     Disposition: Patient is medically stable and will be discharged to inpatient psychiatric unit. Time spent on discharge 32 minutes spent in assessing patient, reviewing medications, discussion with nursing, confirming safe discharge plan and preparation of discharge summary.     Signed:  Electronically signed by Merline Comp, APRN - CNP on 1/12/22 at 12:45 PM CST         EMR Dragon/Transcription disclaimer:   Much of this encounter note is an electronic transcription/translation of spoken language to printed text.  The electronic translation of spoken language may permit erroneous, or at times, nonsensical words or phrases to be inadvertently transcribed; although attempts have made to review the note for such errors, some may still exist.

## 2022-01-12 NOTE — PROGRESS NOTES
Occupational Therapy Initial Assessment  Date: 2022   Patient Name: Loan Hunt  MRN: [id number]     : 1955    Date of Service: 2022    Discharge Recommendations:   (pt in process for transfer to 6th floor (psychiatry))        Assessment   Assessment: OT evaluation completed and tx initiated. Pt might benefit from continued skilled services to address functional deficits. Pt is currently unable to independently complete ADLs and functional ambulation safely. If/when pt's functional status improves, tentative plan is to D/C to 6th floor for psychological based care. OT Education: Precautions;Transfer Training;Orientation  Barriers to Learning: cognition  REQUIRES OT FOLLOW UP: Yes  Activity Tolerance  Activity Tolerance: Treatment limited secondary to decreased cognition  Safety Devices  Safety Devices in place: Yes  Type of devices: Bed alarm in place;Call light within reach; Left in bed;Nurse notified (nsg notified of status; current suicide precautions/bedside attendant present)           Patient Diagnosis(es): The primary encounter diagnosis was Drug overdose, undetermined intent, initial encounter. Diagnoses of Aspiration pneumonia, unspecified aspiration pneumonia type, unspecified laterality, unspecified part of lung ([address] and Acute respiratory failure with hypoxia ([address] were also pertinent to this visit. has no past medical history on file. has no past surgical history on file.            Restrictions  Restrictions/Precautions  Restrictions/Precautions: Fall Risk,Swallowing - Thickened Liquids  Required Braces or Orthoses?: No  Position Activity Restriction  Other position/activity restrictions: SUICIDE precautions; honey thick liquids, soft/bite-sized foods    Subjective   General  Chart Reviewed: Yes  Patient assessed for rehabilitation services?: Yes  Family / Caregiver Present: No  Diagnosis: intentional drug overdose; Hypoxemic respiratory failure - resolved  Patient Currently in Pain: Denies  Pain Assessment  Pain Assessment: Faces  Stauffer-Chaves Pain Rating: No hurt  Vital Signs  Level of Consciousness: Responds to Pain (2)  Patient Currently in Pain: Denies    Social/Functional History  Social/Functional History  Lives With: Spouse (per chart review pt lives with  in [facility]; further environment details unknown)  ADL Assistance: Independent  Ambulation Assistance: Independent  Transfer Assistance: Independent  Additional Comments: Per pt report, limited d/t cognition. Chart review also limited; no family in room. Has DTR       Objective   Vision:  (reported that she \"lost her glasses;\" has had difficulty keeping eyes open throughout eval/tx (true functional observation of vision not seen))  Hearing: Within functional limits    Orientation  Overall Orientation Status: Impaired  Orientation Level: Oriented to person;Disoriented to time;Disoriented to place (name and ; replied \"Sade\" when asked current location)  Observation/Palpation  Posture: Good  Observation: keeps eyes shut much of the time with mobility  Balance  Sitting Balance: Stand by assistance (- CGA; impulsivity noted)  Standing Balance: Contact guard assistance (x2; vs min A x1)  Functional Mobility  Functional - Mobility Device: No device (HHA x 2; intermittent difficulty grasping (therapist held onto pt))  Assist Level: Contact guard assistance (x2; 1 individual was more SBA to give HHA for guidance)  Functional Mobility Comments: within room  Toilet Transfers  Toilet - Technique: Stand step  Equipment Used: Standard bedside commode  Toilet Transfer: Contact guard assistance;Minimal assistance (+1 SBA for safety)  Toilet Transfers Comments: Pt alert intermittently; suggesting BSC use until performance is consistent. ADL  Feeding: Moderate assistance;Setup; Thickened liquids (bedside attendant stated that she had to feed pt; statement aligns with poor ability to grasp and initiate movement)  Grooming: Moderate assistance  UE Bathing: Moderate assistance (seated)  LE Bathing: Maximum assistance (suggesting majority of act to be seated)  UE Dressing: Minimal assistance; Moderate assistance (seated)  LE Dressing: Maximum assistance (for standing components)  Toileting: Maximum assistance  Additional Comments: Has some  but requires extensive vc to initiate use. Deficits seen in all ADLs at this time d/t cognition. Decreased ability to complete commands which increases fall risk for standing activities. Coordination  Movements Are Fluid And Coordinated: No  Coordination and Movement description: Fine motor impairments;Gross motor impairments;Decreased speed;Decreased accuracy; Right UE;Left UE     Bed mobility  Supine to Sit: Contact guard assistance;Minimal assistance  Sit to Supine: Contact guard assistance (with vc; pt attempted actions quickly and without looking at destination)  Comment: HOB elevated for supine > sit  Transfers  Stand Step Transfers: Contact guard assistance;2 Person assistance  Sit to stand: Contact guard assistance  Stand to sit: Contact guard assistance  Transfer Comments: HHA x 2     Cognition  Overall Cognitive Status: Exceptions  Following Commands: Follows one step commands with repetition; Follows one step commands with increased time  Attention Span: Difficulty attending to directions  Memory: Decreased recall of precautions  Safety Judgement: Decreased awareness of need for assistance;Decreased awareness of need for safety  Problem Solving: Assistance required to implement solutions;Assistance required to generate solutions  Insights: Not aware of deficits  Initiation: Requires cues for all  Sequencing: Requires cues for all  Cognition Comment: Pt perseverated over requests for therapy to call family; overall slow processing and executing basic tasks.         Sensation  Overall Sensation Status: WFL (denies impairment)        LUE AROM (degrees)  LUE AROM : WFL  RUE AROM (degrees)  RUE AROM : WFL  LUE Strength  L Hand General: 3+/5 (could be d/t weakness; recent events point towards cognition/execution of act)  LUE Strength Comment: Grossly 4-/5 (average)  RUE Strength  R Hand General: 3+/5 (could be d/t weakness; recent events point towards cognition/execution of act)  RUE Strength Comment: Grossly 4-/5 (average)              Included Treatment  Tx consisted of: bed mobility; pt education and orientation; transfer training; activity tolerance/balance challenges; and functional ambulation. (Treatment time: 15 min)       Plan   Plan  Times per week: 2-3  Current Treatment Recommendations: Safety Education & Training,Balance Training,Patient/Caregiver Education & Training,Self-Care / ADL,Cognitive/Perceptual Training,Functional Mobility Training,Endurance Training,Cognitive Reorientation    Goals  Short term goals  Time Frame for Short term goals: 1 week  Short term goal 1: Complete full-body dressing with SBA. Short term goal 2: Complete toileting skills with SBA. Short term goal 3: Perform dynamic ADL task in unsupported standing with SBA. Long term goals  Long term goal 1: Progress as tolerated.                Sumi Kamara OTR/L  Electronically signed by Sumi Kamara OTR/L on 1/12/2022 at 1:48 PM.

## 2022-01-12 NOTE — PROGRESS NOTES
[facility]      Progress Note    Patient:  Avni Byrd  YOB: 1955  Date of Service: 1/12/2022  MRN: [id number]   Acct: [de-identified]   Primary Care Physician: No primary care provider on file. Advance Directive: Full Code  Admit Date: 1/8/2022       Hospital Day: 4    Portions of this note have been copied forward, however, updated to reflect the most current clinical status of this patient. CHIEF COMPLAINT altered mental status, SI attempt    SUBJECTIVE:  Ms. Willie Valdivia was resting comfortably in bed this morning. Currently alert and oriented x 4. Denies HI or SI at this time. CUMULATIVE HOSPITAL COURSE:   The patient is a 20-year-old female with unknown past medical history who presented to [facility] ED with complaints of intentional overdose. Per review of chart patient took unknown amounts of Ativan and was brought to ED as she was minimally responsive on morning of admission. She required intubation for airway protection in ED. Patient was initially admitted to ICU for close monitoring. Patient was successfully extubated to 2 L nasal cannula on 1/9/2022. She was transferred out of ICU on 1/10/2022. Psychiatry was consulted, whom recommended inpatient admission to psychiatric unit for full psychiatric evaluation and possible psychotropic medication management once medically stable. Patient much more alert oriented today. Denied previous SI attempt. Currently denies HI or SI at this time. Review of Systems   Constitutional: Negative for chills, diaphoresis, fatigue and fever. HENT: Negative for congestion, ear pain, sinus pain, sore throat and trouble swallowing. Eyes: Negative for visual disturbance. Respiratory: Negative for cough, shortness of breath and wheezing. Cardiovascular: Negative for chest pain, palpitations and leg swelling.    Gastrointestinal: Negative for abdominal distention, abdominal pain, blood in stool, constipation, diarrhea, nausea and vomiting. Endocrine: Negative for cold intolerance and heat intolerance. Genitourinary: Negative for difficulty urinating, flank pain, frequency and urgency. Musculoskeletal: Positive for arthralgias. Negative for myalgias. Bilateral hand pain    Neurological: Negative for dizziness, syncope, weakness, light-headedness, numbness and headaches. Hematological: Does not bruise/bleed easily. Psychiatric/Behavioral: Negative for agitation, confusion and dysphoric mood. Objective:   VITALS:  /67   Pulse 88   Temp 96.2 °F (35.7 °C) (Temporal)   Resp 17   Ht 5' 6\" (1.676 m)   Wt 205 lb 6.4 oz (93.2 kg)   SpO2 96%   BMI 33.15 kg/m²   24HR INTAKE/OUTPUT:  No intake or output data in the 24 hours ending 01/12/22 0915      Physical Exam  Constitutional:       General: She is not in acute distress. Appearance: Normal appearance. She is not toxic-appearing or diaphoretic. HENT:      Head: Normocephalic and atraumatic. Right Ear: External ear normal.      Left Ear: External ear normal.      Nose: Nose normal. No congestion or rhinorrhea. Mouth/Throat:      Mouth: Mucous membranes are moist.      Pharynx: Oropharynx is clear. Eyes:      General: No scleral icterus. Extraocular Movements: Extraocular movements intact. Conjunctiva/sclera: Conjunctivae normal.   Cardiovascular:      Rate and Rhythm: Normal rate and regular rhythm. Pulses: Normal pulses. Heart sounds: Normal heart sounds. No murmur heard. No friction rub. No gallop. Pulmonary:      Effort: Pulmonary effort is normal. No respiratory distress. Breath sounds: Normal breath sounds. No wheezing, rhonchi or rales. Abdominal:      General: Abdomen is flat. Bowel sounds are normal. There is no distension. Palpations: Abdomen is soft. Tenderness: There is no abdominal tenderness. Musculoskeletal:         General: No swelling. Normal range of motion.       Cervical back: Normal range of motion and neck supple. Right lower leg: No edema. Left lower leg: No edema. Skin:     General: Skin is warm and dry. Coloration: Skin is not jaundiced. Findings: No erythema, lesion or rash. Neurological:      Mental Status: She is alert and oriented to person, place, and time. Cranial Nerves: No cranial nerve deficit. Sensory: No sensory deficit. Motor: No weakness. Comments: Alert & oriented X 4             Medications:      sodium chloride        amLODIPine  5 mg Oral Daily    sodium chloride flush  5-40 mL IntraVENous 2 times per day    enoxaparin  40 mg SubCUTAneous Q24H    chlorhexidine  15 mL Mouth/Throat BID    famotidine (PEPCID) injection  20 mg IntraVENous BID     sodium chloride flush, sodium chloride, ondansetron **OR** ondansetron  ADULT DIET; Dysphagia - Soft and Bite Sized; Moderately Thick (Honey)     Lab and other Data:     Recent Labs     01/10/22  0141 01/11/22  0839 01/12/22  0346   WBC 8.7 5.9 6.5   HGB 11.7* 12.2 13.0    268 340     Recent Labs     01/10/22  0141 01/11/22  0839 01/12/22  0346    140 142   K 3.6 3.7 4.3    102 104   CO2 27 24 23   BUN 7* 9 12   CREATININE 0.6 0.6 0.7   GLUCOSE 88 96 75     Recent Labs     01/10/22  0141   AST 17   ALT 11   BILITOT 0.3   ALKPHOS 62       RAD:     CT HEAD WO CONTRAST  Result Date: 1/8/2022    Mild cerebral and cerebellar volume loss with chronic microvascular disease but no evidence of acute intracranial process. Signed by Dr Nora James      XR CHEST PORTABLE  Result Date: 1/8/2022    1. Endotracheal tube is 1 cm proximal to the joão 2. Groundglass opacity right lung base slightly increased from prior exam of the same date. Signed by Dr Nora James      XR CHEST PORTABLE  Result Date: 1/8/2022    1. Vague groundglass density right cardiophrenic angle. This may be either an early inflammatory process or possibly atelectasis. . Signed by Dr Dano Barrientos: COVID-19, Rapid [[id number]] Collected: 01/08/22 6588   Order Status: Completed Specimen: Nasopharyngeal Swab Updated: 01/08/22 0756    SARS-CoV-2, NAAT Not Detected     Culture, Blood 1 [[id number]] Collected: 01/08/22 0811   Order Status: Completed Specimen: Blood Updated: 01/09/22 0914    Blood Culture, Routine No Growth to date.  Any change in status will be called. Culture, Blood 2 [[id number]] Collected: 01/08/22 0816   Order Status: Completed Specimen: Blood Updated: 01/09/22 0914    Culture, Blood 2 No Growth to date.  Any change in status will be called. Assessment/Plan   Active Problems:    Intentional drug overdose ([facility] [address]  Resolved Problems:    * No resolved hospital problems. *      Active Problems:    Intentional drug overdose [facility])-    - Psychiatry recommended inpatient admission to psychiatric unit for full psychiatric evaluation and possible psychotropic medication management once medically stable    - Monitor on telemetry    - Continue PT/OT    - SLP recommended soft diet with moderately thickened liquids          Hypoxemic respiratory failure-    -Resolved,  currently on room air              DVT Prophylaxis: Lovenox       GI prophylaxis:  Pepcid     Discharge planning: To inpatient psychiatric unit once medically stable for DC        Further Orders per Clinical course/attending. Electronically signed by Merline Comp, APRN - CNP on 1/12/2022 at 9:15 AM       EMR Dragon/Transcription disclaimer:   Much of this encounter note is an electronic transcription/translation of spoken language to printed text.  The electronic translation of spoken language may permit erroneous, or at times, nonsensical words or phrases to be inadvertently transcribed; although attempts have made to review the note for such errors, some may still exist.

## 2022-01-12 NOTE — PROGRESS NOTES
Physical Therapy    Facility/Department: [facility] SURG SERVICES  Initial Assessment    NAME: Shanta Sarkar  : 1955  MRN: [id number]    Date of Service: 2022    Discharge Recommendations:  Continue to assess pending progress,24 hour supervision or assist,Patient would benefit from continued therapy after discharge        Assessment   Body structures, Functions, Activity limitations: Decreased functional mobility ; Decreased safe awareness;Decreased cognition;Decreased balance;Decreased coordination  Assessment: Pt. will benefit from cont. PT while in acute care. Pt.'s nurse anticipates pt will d/c to [facility], but at this time pt is not exhibiting good safety awareness, balance and mobility to be up ad kim. Pt. needs 24 hr care currently. Pt. will benefit from use of RW, gait belt (stored in PT dept due to suicide prec) and staff A. Treatment Diagnosis: impaired gait and mobility  Prognosis: Fair  Decision Making: Medium Complexity  PT Education: Goals;PT Role;Plan of Care;Transfer Training;General Safety; Functional Mobility Training;Gait Training  Patient Education: use of call light for staff A  Barriers to Learning: cognition, grogginess  REQUIRES PT FOLLOW UP: Yes  Activity Tolerance  Activity Tolerance: Patient limited by cognitive status; Other  Activity Tolerance: groggy       Patient Diagnosis(es): The primary encounter diagnosis was Drug overdose, undetermined intent, initial encounter. Diagnoses of Aspiration pneumonia, unspecified aspiration pneumonia type, unspecified laterality, unspecified part of lung ([address] and Acute respiratory failure with hypoxia ([address] were also pertinent to this visit. has no past medical history on file. has no past surgical history on file.     Restrictions  Restrictions/Precautions  Restrictions/Precautions: Fall Risk,Swallowing - Thickened Liquids  Required Braces or Orthoses?: No  Position Activity Restriction  Other position/activity restrictions: SUICIDE precautions; honey thick liquids, soft/bite-sized foods  Vision/Hearing  Vision:  (reported that she \"lost her glasses;\" has had difficulty keeping eyes open throughout eval/tx (true functional observation of vision not seen))  Hearing: Within functional limits     Subjective  General  Chart Reviewed: Yes  Patient assessed for rehabilitation services?: Yes  Response To Previous Treatment: Not applicable  Family / Caregiver Present: No  Referring Practitioner: IKE Richey CNP  Referral Date : 01/12/22  Diagnosis: intentional drug OD, hypoxemic respiratory failure, s/p suicide attempt  Follows Commands: Impaired  Other (Comment): pt groggy  General Comment  Comments: RN, Wagner Valenzuela, requesting pt be seen to see if she would be ready to go to [facility]. Subjective  Subjective: Pt. willing to walk. Pain Screening  Patient Currently in Pain: Denies  Vital Signs  Patient Currently in Pain: Denies  Pre Treatment Pain Screening  Intervention List: Patient able to continue with treatment    Orientation  Orientation  Overall Orientation Status: Impaired  Orientation Level: Oriented to person;Disoriented to place  Social/Functional History  Social/Functional History  Lives With: Spouse (per chart review pt lives with  in [facility]; further environment details unknown)  ADL Assistance: Independent  Ambulation Assistance: Independent  Transfer Assistance: Independent  Additional Comments: Per pt report, limited d/t cognition. Chart review also limited; no family in room. Has DTR  Cognition   Cognition  Overall Cognitive Status: Exceptions  Following Commands: Follows one step commands with repetition; Follows one step commands with increased time  Attention Span: Difficulty attending to directions  Memory: Decreased recall of precautions  Safety Judgement: Decreased awareness of need for assistance;Decreased awareness of need for safety  Problem Solving: Assistance required to implement solutions;Assistance required to generate solutions  Insights: Not aware of deficits  Initiation: Requires cues for all  Sequencing: Requires cues for all  Cognition Comment: Pt perseverated over requests for therapy to call family; overall slow processing and executing basic tasks. Objective     Observation/Palpation  Posture: Good  Observation: keeps eyes shut much of the time with mobility    AROM RLE (degrees)  RLE AROM: WFL  AROM LLE (degrees)  LLE AROM : WFL  Strength RLE  Strength RLE: WFL  Comment: grossly 4/5  Strength LLE  Strength LLE: WFL  Comment: grossly 4/5     Sensation  Overall Sensation Status: WFL (denies impairment)  Bed mobility  Supine to Sit: Contact guard assistance;Minimal assistance  Sit to Supine: Contact guard assistance  Comment: pt declined sitting in chair  Transfers  Sit to Stand: Minimal Assistance;Contact guard assistance;2 Person Assistance  Stand to sit: Contact guard assistance;Minimal Assistance;2 Person Assistance  Ambulation  Ambulation?: Yes  Ambulation 1  Surface: level tile  Device: Hand-Held Assist (x2A)  Assistance: Minimal assistance;Contact guard assistance;2 Person assistance  Quality of Gait: unsteady, but pt not fully awake, appears groggy  Gait Deviations: Deviated path; Slow [place];Decreased step length;Decreased step height  Distance: 25'     Balance  Posture: Good  Sitting - Static: Fair;+  Sitting - Dynamic: Fair;-  Standing - Static: Fair;-  Standing - Dynamic: Poor;+        Plan   Plan  Times per week: 3-7  Times per day: Daily  Plan weeks: 2  Current Treatment Recommendations: Balance Training,Functional Mobility Training,Endurance Training,Transfer Training,Gait Training,Safety Education & Training,Positioning,Equipment Evaluation, Education, & procurement,Patient/Caregiver Education & Training  Plan Comment: cont.  PT with progressive mobility  Safety Devices  Type of devices: Gait belt,Patient at risk for falls,Nurse notified,Left in bed,Bed alarm in place,Sitter present (pt has gait belt stored in PT office)    G-Code       OutComes Score                                                  AM-PAC Score             Goals  Short term goals  Time Frame for Short term goals: 2 wks  Short term goal 1: supine to sit indep  Short term goal 2: sit to stand indep  Short term goal 3: amb. 100' with RW SBA  Patient Goals   Patient goals : not stated       Therapy Time   Individual Concurrent Group Co-treatment   Time In           Time Out           Minutes                   Marija Alva, PT    Electronically signed by Marija Alva, PT on 1/12/2022 at 1:52 PM

## 2022-01-13 ENCOUNTER — HOSPITAL ENCOUNTER (INPATIENT)
Age: 67
LOS: 8 days | Discharge: HOME | DRG: 918 | End: 2022-01-21
Attending: PSYCHIATRY & NEUROLOGY | Admitting: PSYCHIATRY & NEUROLOGY
Payer: MEDICARE

## 2022-01-13 VITALS
SYSTOLIC BLOOD PRESSURE: 124 MMHG | OXYGEN SATURATION: 94 % | BODY MASS INDEX: 33.01 KG/M2 | HEART RATE: 80 BPM | TEMPERATURE: 97.5 F | DIASTOLIC BLOOD PRESSURE: 75 MMHG | WEIGHT: 205.4 LBS | RESPIRATION RATE: 16 BRPM | HEIGHT: 66 IN

## 2022-01-13 LAB
ANION GAP SERPL CALCULATED.3IONS-SCNC: 15 MMOL/L (ref 7–19)
BASOPHILS ABSOLUTE: 0.1 K/UL (ref 0–0.2)
BASOPHILS RELATIVE PERCENT: 0.9 % (ref 0–1)
BLOOD CULTURE, ROUTINE: NORMAL
BUN BLDV-MCNC: 14 MG/DL (ref 8–23)
CALCIUM SERPL-MCNC: 9.6 MG/DL (ref 8.8–10.2)
CHLORIDE BLD-SCNC: 105 MMOL/L (ref 98–111)
CO2: 23 MMOL/L (ref 22–29)
CREAT SERPL-MCNC: 0.7 MG/DL (ref 0.5–0.9)
CULTURE, BLOOD 2: NORMAL
EOSINOPHILS ABSOLUTE: 0.2 K/UL (ref 0–0.6)
EOSINOPHILS RELATIVE PERCENT: 2.8 % (ref 0–5)
GFR AFRICAN AMERICAN: >59
GFR NON-AFRICAN AMERICAN: >60
GLUCOSE BLD-MCNC: 95 MG/DL (ref 74–109)
HCT VFR BLD CALC: 38.9 % (ref 37–47)
HEMOGLOBIN: 13 G/DL (ref 12–16)
IMMATURE GRANULOCYTES #: 0 K/UL
LYMPHOCYTES ABSOLUTE: 2 K/UL (ref 1.1–4.5)
LYMPHOCYTES RELATIVE PERCENT: 36.7 % (ref 20–40)
MCH RBC QN AUTO: 30.3 PG (ref 27–31)
MCHC RBC AUTO-ENTMCNC: 33.4 G/DL (ref 33–37)
MCV RBC AUTO: 90.7 FL (ref 81–99)
MONOCYTES ABSOLUTE: 0.6 K/UL (ref 0–0.9)
MONOCYTES RELATIVE PERCENT: 11.1 % (ref 0–10)
NEUTROPHILS ABSOLUTE: 2.6 K/UL (ref 1.5–7.5)
NEUTROPHILS RELATIVE PERCENT: 48.1 % (ref 50–65)
PDW BLD-RTO: 12.5 % (ref 11.5–14.5)
PLATELET # BLD: 319 K/UL (ref 130–400)
PMV BLD AUTO: 10 FL (ref 9.4–12.3)
POTASSIUM REFLEX MAGNESIUM: 3.9 MMOL/L (ref 3.5–5)
RBC # BLD: 4.29 M/UL (ref 4.2–5.4)
SARS-COV-2, NAAT: NOT DETECTED
SODIUM BLD-SCNC: 143 MMOL/L (ref 136–145)
WBC # BLD: 5.3 K/UL (ref 4.8–10.8)

## 2022-01-13 PROCEDURE — 92526 ORAL FUNCTION THERAPY: CPT

## 2022-01-13 PROCEDURE — 6360000002 HC RX W HCPCS: Performed by: INTERNAL MEDICINE

## 2022-01-13 PROCEDURE — 87635 SARS-COV-2 COVID-19 AMP PRB: CPT

## 2022-01-13 PROCEDURE — 80048 BASIC METABOLIC PNL TOTAL CA: CPT

## 2022-01-13 PROCEDURE — 85025 COMPLETE CBC W/AUTO DIFF WBC: CPT

## 2022-01-13 PROCEDURE — 92523 SPEECH SOUND LANG COMPREHEN: CPT

## 2022-01-13 PROCEDURE — 97116 GAIT TRAINING THERAPY: CPT

## 2022-01-13 PROCEDURE — 6370000000 HC RX 637 (ALT 250 FOR IP): Performed by: INTERNAL MEDICINE

## 2022-01-13 PROCEDURE — 36415 COLL VENOUS BLD VENIPUNCTURE: CPT

## 2022-01-13 PROCEDURE — 1240000000 HC EMOTIONAL WELLNESS R&B

## 2022-01-13 RX ORDER — PANTOPRAZOLE SODIUM 40 MG/1
40 GRANULE, DELAYED RELEASE ORAL
COMMUNITY

## 2022-01-13 RX ORDER — MONTELUKAST SODIUM 10 MG/1
10 TABLET ORAL DAILY PRN
COMMUNITY

## 2022-01-13 RX ORDER — ACETAMINOPHEN 325 MG/1
650 TABLET ORAL EVERY 4 HOURS PRN
Status: DISCONTINUED | OUTPATIENT
Start: 2022-01-13 | End: 2022-01-14

## 2022-01-13 RX ORDER — LEVOTHYROXINE SODIUM 0.15 MG/1
150 TABLET ORAL DAILY
COMMUNITY

## 2022-01-13 RX ORDER — POLYETHYLENE GLYCOL 3350 17 G/17G
17 POWDER, FOR SOLUTION ORAL DAILY PRN
Status: DISCONTINUED | OUTPATIENT
Start: 2022-01-13 | End: 2022-01-21 | Stop reason: HOSPADM

## 2022-01-13 RX ORDER — BUPROPION HYDROCHLORIDE 150 MG/1
150 TABLET, EXTENDED RELEASE ORAL 2 TIMES DAILY
Status: ON HOLD | COMMUNITY
End: 2022-01-14

## 2022-01-13 RX ADMIN — AMLODIPINE BESYLATE 5 MG: 5 TABLET ORAL at 10:27

## 2022-01-13 RX ADMIN — ENOXAPARIN SODIUM 40 MG: 100 INJECTION SUBCUTANEOUS at 10:28

## 2022-01-13 ASSESSMENT — SLEEP AND FATIGUE QUESTIONNAIRES
DIFFICULTY ARISING: NO
SLEEP PATTERN: DIFFICULTY FALLING ASLEEP;RESTLESSNESS
DIFFICULTY FALLING ASLEEP: YES
DO YOU USE A SLEEP AID: NO
DO YOU HAVE DIFFICULTY SLEEPING: YES
RESTFUL SLEEP: NO
DIFFICULTY STAYING ASLEEP: YES

## 2022-01-13 ASSESSMENT — LIFESTYLE VARIABLES: HISTORY_ALCOHOL_USE: NO

## 2022-01-13 ASSESSMENT — PATIENT HEALTH QUESTIONNAIRE - PHQ9: SUM OF ALL RESPONSES TO PHQ QUESTIONS 1-9: 16

## 2022-01-13 NOTE — PROGRESS NOTES
Physical Therapy  Name: Anam Delcid  MRN:  [id number]  Date of service:  1/13/2022 01/13/22 1048   Restrictions/Precautions   Restrictions/Precautions Fall Risk;Swallowing - Thickened Liquids   Required Braces or Orthoses? No   Position Activity Restriction   Other position/activity restrictions SUICIDE precautions; honey thick liquids, soft/bite-sized foods   Subjective   Subjective Pt agreed to therapy. Pain Screening   Patient Currently in Pain No   Bed Mobility   Supine to Sit Supervision   Sit to Supine Supervision   Transfers   Sit to Stand Stand by assistance   Stand to sit Stand by assistance   Ambulation   Ambulation? Yes   Ambulation 1   Surface level tile   Device Rolling Walker   Assistance Stand by assistance   Quality of Gait steady overall   Gait Deviations Slow [place]   Distance 80'   Patient Goals    Patient goals  not stated   Short term goals   Time Frame for Short term goals 2 wks   Short term goal 1 supine to sit indep   Short term goal 2 sit to stand indep   Short term goal 3 amb. 100' with RW SBA   Conditions Requiring Skilled Therapeutic Intervention   Body structures, Functions, Activity limitations Decreased functional mobility ; Decreased safe awareness;Decreased cognition;Decreased balance;Decreased coordination   Assessment Pt has improved mobility today overall. Pt was steady with gait using RW, pt declined up in chair at this time but encouraged pt to get up to chair later today. Activity Tolerance   Activity Tolerance Patient Tolerated treatment well   Safety Devices   Type of devices Bed alarm in place;Call light within reach; Left in chair;Sitter present         Electronically signed by Merlin Pill, PTA on 1/13/2022 at 10:55 AM

## 2022-01-13 NOTE — DISCHARGE SUMMARY
[facility]    Discharge Summary      Jade Nichole  :  1955  MRN:  [id number]    Admit date:  2022  Discharge date:    2022    Discharging Physician:  Dr. Alicia Johnson     Advance Directive: Full Code    Consults: psychiatry    Primary Care Physician:  No primary care provider on file. Discharge Diagnoses: Active Problems:    Intentional drug overdose ([address]  Resolved Problems:    * No resolved hospital problems. *      Portions of this note have been copied forward, however, changed to reflect the most current clinical status of this patient. Hospital Course: The patient is a 56-year-old female with unknown past medical history who presented to [facility] ED with complaints of intentional overdose. Per review of chart patient took unknown amounts of Ativan and was brought to ED as she was minimally responsive on morning of admission. She required intubation for airway protection in ED. Patient was initially admitted to ICU for close monitoring. Patient was successfully extubated to 2 L nasal cannula on 2022. She was transferred out of ICU on 1/10/2022. Psychiatry was consulted, whom recommended inpatient admission to psychiatric unit for full psychiatric evaluation and possible psychotropic medication management once medically stable. Patient much more alert oriented. Denied previous SI attempt. Currently denies HI or SI at this time. She is currently on room air maintaining adequate saturation. Patient has been participating with PT/OT and is doing well. Per nursing she is able to do her own ADLs. Patient is currently in stable condition to be discharged to inpatient psychiatric unit. Significant Diagnostic Studies:     CT HEAD WO CONTRAST  Result Date: 2022    Mild cerebral and cerebellar volume loss with chronic microvascular disease but no evidence of acute intracranial process. Signed by Dr Vesna Kirby      XR CHEST PORTABLE  Result Date: 2022    1. Endotracheal tube is 1 cm proximal to the joão 2. Groundglass opacity right lung base slightly increased from prior exam of the same date. Signed by Dr Vesna Kirby      XR CHEST PORTABLE  Result Date: 1/8/2022    1. Vague groundglass density right cardiophrenic angle. This may be either an early inflammatory process or possibly atelectasis. . Signed by Dr Vesna Kirby      Pertinent Labs:   CBC:   Recent Labs     01/11/22  0839 01/12/22  0346 01/13/22  0331   WBC 5.9 6.5 5.3   HGB 12.2 13.0 13.0    340 319     BMP:    Recent Labs     01/11/22  0839 01/12/22  0346 01/13/22  0331    142 143   K 3.7 4.3 3.9    104 105   CO2 24 23 23   BUN 9 12 14   CREATININE 0.6 0.7 0.7   GLUCOSE 96 75 95       Physical Exam:   Vital Signs: /75   Pulse (!) 46   Temp 97.5 °F (36.4 °C) (Temporal)   Resp 16   Ht 5' 6\" (1.676 m)   Wt 205 lb 6.4 oz (93.2 kg)   SpO2 94%   BMI 33.15 kg/m²   General appearance:. Alert and Cooperative   HEENT: Normocephalic. Chest: Lung sounds clear bilaterally without wheezes or rhonchi. Cardiac: RRR, S1, S2 normal. No murmurs, gallops, or rubs auscultated. Abdomen: soft, non-tender; non-distended normal bowel sounds no masses, no organomegaly. Extremities: No clubbing or cyanosis. No peripheral edema. Peripheral pulses palpable. Neurologic: Grossly intact. Discharge Medications:       Final medication reconciliation to be completed upon discharge from inpatient psychiatric unit. Discharge Instructions: Follow up with PCP in 7 days   After being discharged from inpatient psychiatric unit. Take medications as directed. Resume activity as tolerated. Diet: ADULT DIET; Easy to Chew; Mildly Thick (Nectar)     Disposition: Patient is medically stable and will be discharged to inpatient psychiatric unit.     Time spent on discharge 32 minutes spent in assessing patient, reviewing medications, discussion with nursing, confirming safe discharge plan and preparation of discharge summary. Signed:  Electronically signed by IKE Angel CNP on 1/12/22 at 12:45 PM CST         EMR Dragon/Transcription disclaimer:   Much of this encounter note is an electronic transcription/translation of spoken language to printed text.  The electronic translation of spoken language may permit erroneous, or at times, nonsensical words or phrases to be inadvertently transcribed; although attempts have made to review the note for such errors, some may still exist.

## 2022-01-13 NOTE — PROGRESS NOTES
[facility] Admission from 5th floor  Nursing Admission Note        Reason for Admission: pt is a 73year old female who was brought to the ED by EMS for intentional overdose on Ativan. pt medically cleared and [facility] [address] [facility] [address] from 5th floor for depression with suicide attempt. Patient Active Problem List   Diagnosis    Intentional drug overdose ([address]         Addictive Behavior:   Addictive Behavior  In the past 3 months, have you felt or has someone told you that you have a problem with:  : None  Do you have a history of Chemical Use?: No  Do you have a history of Alcohol Use?: No  Do you have a history of Street Drug Abuse?: No  Histroy of Prescripton Drug Abuse?: No    Medical Problems:   No past medical history on file. Status EXAM:  Status and Exam  Normal: Yes  Affect: Congruent  Level of Consciousness: Alert  Mood:Normal: No  Mood: Depressed,Anxious (rates depression and anxiety 6)  Motor Activity:Normal: Yes  Interview Behavior: Cooperative  Preception: Cofield to Person,Cofield to Time,Cofield to Place,Cofield to Situation  Attention:Normal: Yes  Thought Processes:  (linear)  Thought Content:Normal: Yes  Hallucinations: None  Delusions: No  Memory:Normal: Yes  Insight and Judgment: No  Insight and Judgment:  (impaired)  Present Suicidal Ideation: No  Present Homicidal Ideation: No      Metabolic Screening:    No results found for: LABA1C  No results found for: CHOL  No results found for: TRIG  No results found for: HDL  No components found for: LDLCAL  No results found for: LABVLDL    There is no height or weight on file to calculate BMI.   BP Readings from Last 2 Encounters:   01/13/22 131/79   01/13/22 124/75       PATIENT STRENGTHS:  Strengths: No significant Physical Illness,Positive Support,Social Skills,Communication    Patient Strengths and Limitations:  Positive support system, No significant physical illness, Educated, Social skills    Tobacco Screening:  Practical Counseling, on admission, violet X, if applicable and completed (first 3 are required if patient doesn't refuse):            Recognizing danger situations (included triggers and roadblocks)                 Coping skills (new ways to manage stress, exercise, relaxation techniques, changing routine, distraction                                                      Basic information about quitting (benefits of quitting, techniques in how to quit, available resources   Referral for counseling faxed to [facility]                                            Patient refused counseling   Patient has not smoked in the last 30 days   Patient offered nicotine patch. Received   Refused   Patient is a non-smoker yes         Admission to Unit:    Pt admitted to [facility] under the care of Dr. López Edge,  arrived on unit via [address] with security and staff from 5th floor (520)    Patient arrived dressed in paper scrubs:  yes. Body assessment and safety check completed by Jun Caro RN & Janelle Cabrera, RN and  no contraband discovered. Patient belongings was cataloged and accounted for by Jun Caro RN. No valuables or medications brought with pt. Admission completed by Jun Caro RN & Janelle Cabrera, RN. Oriented to unit, unit policy and expectations:  yes    Reviewed and explained all legal documents:  yes    Education for Fall Prevention and Restraints given: yes    Patient signed all legal documents yes   Pt verbalizes understanding:yes     Radha Sim? yes    Identifies stressors. yes   Pt reports being down/depressed and thinking about different bad situations in her head. COVID TEACHING: Nursing provided education regarding COVID for social distancing, wearing masks, washing hands, and reporting any symptoms: yes  Mask Provided: yes If patient refused, reason: all pts have had a negative COVID test      Admission Note:    Pt is admitted to [address] voluntarily from an intentional overdose on Ativan after being medically cleared.  Pt is appropriately dressed in paper scrubs. Legals signed and pt oriented to unit. Alert and oriented x4, pleasant and cooperative. Pt reports she decided to attempt suicide because she was thinking about different bad situations (death of children, children being hurt, COVID) in her head and stated \"I thought to myself why don't I just end it now before it gets bad\". Pt states that her  was supposed to also attempt suicide, but \"he backed out and it pissed me off, so I took a whole bottle of Ativan\". Rates depression and anxiety 6. Denies SI (current), HI, and AVH. Will continue to monitor.          Electronically signed by Jean Zaragoza RN on 1/13/22 at 2:52 PM CST

## 2022-01-13 NOTE — PROGRESS NOTES
Speech Language Pathology  Facility/Department: [facility] 5 SURG SERVICES  Initial Speech/Language/Cognitive Assessment  Swallow Therapy     NAME: Bruce Munoz  : 1955   MRN: [id number]  ADMISSION DATE: 2022  ADMITTING DIAGNOSIS: has Intentional drug overdose ([address] on their problem list.    Date of Eval: 2022   Evaluating Therapist: JULIA Piña    Assessment:  Completed assessment. SLP ranked functional intelligibility of speech for unfamiliar listeners at 100% in utterances with background noise present. Patient did exhibit slow processing, delayed auditory comprehension, moderately delayed structured responsive speech and decreased topic maintenance in natural conversation, and decreased short-term memory. Also re-evaluated patient's swallowing function. Patient exhibited decreased oral prep of more solid consistencies as well as sluggish, mild-moderately decreased laryngeal elevation for swallow airway protection. Even so, no outward S/S penetration/aspiration was noted with any regular solid consistency trial or thin H2O trial presented during treatment session this date. At this time, secondary to suspected impulsivity, would trial easy to chew consistency with mildly thick/nectar thick liquids. Recommend meds in pudding/applesauce. If patient receives mouth care prior to intake, okay for ice chips and small sips thin H2O IN BETWEEN MEALS for comfort. Will continue to follow. Goals:  Short-term Goals  Timeframe for Short-term Goals: 1-2x/day for 3 days  Goal 1: Patient will tolerate easy to chew consistency and mildly thick nectar liquids with min S/S penetration/aspiration during PO intake. Goal 2: Patient staff will follow swallow safety recommendations to decrease risk of penetration/aspiration during PO intake. Goal 3: Re-assessment of swallow function for potential diet upgrade. Goal 4: Monitor [id number] functioning.     Subjective:  Chart Reviewed: Yes  Patient assessed for rehabilitation services?: Yes  Family / Caregiver Present: No     Objective: Auditory Comprehension  Comprehension:  (While delayed, patient demonstrated ability to answer simple yes/no questions regarding immediate environment and current state of being at independent level. While delayed, patient demonstrated ability to follow simple 1 and simple 2 step commands independently. While delayed, patient demonstrated ability to answer yes/no questions of increased complexity at independent level.)     Expression  Primary Mode of Expression:  (Confrontation naming of items in room was considered to be delayed. Structured responsive speech was considered to be moderately delayed. Decreased topic maintenance was noted in natural conversation.)     Motor Speech:  (Patient exhibited slow, decreased lingual movements during verbalizations. SLP still ranked functional intelligibility of speech for unfamiliar listeners at 100% in utterances with background noise present.)     Overall Orientation Status:  (Patient demonstrated ability to verbalize name, birthday, age, address, phone number, state, month, and year at independent level. Patient did not verbalize city, hospital, [place], or date independently.)     Memory:  (Patient demonstrated appropriate immediate memory with sequences of unrelated numbers/words set up to 5 items without repetitions provided. Patient demonstrated decreased short-term memory with less than 5 minute delay+distractions present.)     Problem Solving:  (It is noted that during evaluation, patient demonstrated ability to verbalize current PCP and pharmacy at independent level. Patient demonstrated ability to verbalize appropriate simple solutions to situations that could occur during activities of daily living independently (ie. 911, acid reflux, burns, cuts, falls, fever, fire, headache). Additional Assessment:   Re-assessed patient's swallowing function.  With regular solid consistency trials presented independently, patient exhibited adequate vertical jaw movement during oral prep. Oral transit of regular solid consistency varied from 1-4 seconds in length and min oral cavity residue was observed post swallows; residue cleared from the mouth with additional dry swallows. Oral transit thin H2O trials, presented independently via cup, primarily measured 1-2 seconds in length. It is noted that patient appeared impulsive and took large, consecutive drinks. Laryngeal elevation during swallow initiation was considered to be sluggish and mild-moderately decreased for swallow airway protection. Even so, no outward S/S penetration/aspiration was observed with any regular solid consistency trial or thin H2O trial presented during treatment session this date. At this time, secondary to suspected impulsivity, would trial easy to chew consistency with mildly thick/nectar thick liquids. Recommend meds in pudding/applesauce. If patient receives mouth care prior to intake, okay for ice chips and small sips thin H2O IN BETWEEN MEALS for comfort. Will continue to follow.     Electronically signed by JULIA Bryant on 1/13/2022 at 12:04 PM

## 2022-01-13 NOTE — PROGRESS NOTES
Pt states \" I just fell in a hole and thought I am 73 why not just end it\" \" now I realize I have a good life I dont know why I did what I did\" Pt A/O x4, 0 S/S of distress noted, 1:1 sitter at bedside

## 2022-01-13 NOTE — PROGRESS NOTES
Admission Note      Reason for admission/Target Symptom: Patient admitted to [facility] due to  female who presents to the emergency department due to suspected overdose. Report is that patient was normal last night and this morning  went to check on her and she was minimally responsive. There was a bottle of Ativan at the bedside that has been half-full and is now nearly empty. No further history available at this time. Patient's drowsy. Moans and screams anytime we try to examine her. Waiting for  to arrive for more information.     Diagnoses: Depression NOS  UDS: Benzodiazepine,Opiate  BAL:  Neg    SW met with treatment team to discuss patient's treatment including care planning, discharge planning, and follow-up needs. Pt has been admitted to [facility]. Treatment team has identified patient's discharge needs as medication management and outpatient therapy/counseling. Pt confirmed  the need for ongoing treatment post inpatient stay. Pt was also provided a handout of contact information for drug and alcohol treatment centers and other community support service such as [facility], AA, and [facility].

## 2022-01-14 PROBLEM — T50.902A SUICIDAL OVERDOSE, INITIAL ENCOUNTER (HCC): Status: ACTIVE | Noted: 2022-01-14

## 2022-01-14 PROCEDURE — 1240000000 HC EMOTIONAL WELLNESS R&B

## 2022-01-14 PROCEDURE — 6370000000 HC RX 637 (ALT 250 FOR IP): Performed by: PSYCHIATRY & NEUROLOGY

## 2022-01-14 PROCEDURE — 99223 1ST HOSP IP/OBS HIGH 75: CPT | Performed by: PSYCHIATRY & NEUROLOGY

## 2022-01-14 RX ORDER — PANTOPRAZOLE SODIUM 40 MG/1
40 TABLET, DELAYED RELEASE ORAL
Status: DISCONTINUED | OUTPATIENT
Start: 2022-01-15 | End: 2022-01-21 | Stop reason: HOSPADM

## 2022-01-14 RX ORDER — HYDROXYZINE HYDROCHLORIDE 25 MG/1
25 TABLET, FILM COATED ORAL 3 TIMES DAILY PRN
Status: DISCONTINUED | OUTPATIENT
Start: 2022-01-14 | End: 2022-01-21 | Stop reason: HOSPADM

## 2022-01-14 RX ORDER — VENLAFAXINE HYDROCHLORIDE 75 MG/1
75 CAPSULE, EXTENDED RELEASE ORAL
Status: DISCONTINUED | OUTPATIENT
Start: 2022-01-15 | End: 2022-01-21 | Stop reason: HOSPADM

## 2022-01-14 RX ORDER — LEVOTHYROXINE SODIUM 0.07 MG/1
150 TABLET ORAL DAILY
Status: DISCONTINUED | OUTPATIENT
Start: 2022-01-14 | End: 2022-01-21 | Stop reason: HOSPADM

## 2022-01-14 RX ORDER — MONTELUKAST SODIUM 10 MG/1
10 TABLET ORAL NIGHTLY
Status: DISCONTINUED | OUTPATIENT
Start: 2022-01-14 | End: 2022-01-21 | Stop reason: HOSPADM

## 2022-01-14 RX ORDER — ACETAMINOPHEN 325 MG/1
650 TABLET ORAL EVERY 4 HOURS PRN
Status: DISCONTINUED | OUTPATIENT
Start: 2022-01-14 | End: 2022-01-21 | Stop reason: HOSPADM

## 2022-01-14 RX ADMIN — LEVOTHYROXINE SODIUM 150 MCG: 0.07 TABLET ORAL at 12:56

## 2022-01-14 RX ADMIN — HYDROXYZINE HYDROCHLORIDE 25 MG: 25 TABLET, FILM COATED ORAL at 21:05

## 2022-01-14 RX ADMIN — MONTELUKAST 10 MG: 10 TABLET, FILM COATED ORAL at 21:05

## 2022-01-14 ASSESSMENT — ENCOUNTER SYMPTOMS
RESPIRATORY NEGATIVE: 1
GASTROINTESTINAL NEGATIVE: 1

## 2022-01-14 ASSESSMENT — PAIN SCALES - GENERAL
PAINLEVEL_OUTOF10: 0
PAINLEVEL_OUTOF10: 0

## 2022-01-14 NOTE — H&P
Department of Psychiatry  Geriatrics  Attending History and Physical        CHIEF COMPLAINT:  Status post suicidal attempt    Reason for Admission to Psychiatric Unit:  Threat to self requiring 24 hour professional observation    The patient requires intensive 24 level of care for the following reasons:  the need for patient safety    HISTORY OF PRESENT ILLNESS:         The primary source(s) of information include(s):  Patient        The patient is a 73 y.o. female with previous psychiatric history of depression and anxiety disorder, who initially was admitted to medical services secondary to overdose by prescribed benzodiazepine medication with suicidal intention. Patient has been seen in treatment team room with presence of the patient's nurse. Patient reported that she has been suffering from depression and anxiety disorder for more than 15 years and her primary care provider has been prescribed psychotropic medications to her. Patient reported that her depression became slightly better, however, she still experiences frequent episodes of anxiety. She reported recent life stressors, stated that both her parents passed away last year. Patient stated \"COVID affected my life, I do not know how to continue my life with COVID around. I was thinking, it is easier to die\". Patient reported that she talked to her  and they both made the decision to die at the same day by committing suicide. She stated that they discuss how they are going to die and wrote letters to the family members. However, patient said that her  changed his mind, stated \"I guess he never wanted to kill himself. He agreed with me, because he thought I will never do this\". Patient reported that she decided to kill herself and overdosed with prescribed benzodiazepines.   She stated that she regrets about her decision, stated that she made the plans to go home and start a new life and asked to be discharged from the hospital today.    During the interview, patient denies any affective symptomatology, she denies feeling of depression, anxiety or psychotic symptoms. Patient denies feeling of hopelessness, helplessness or worthlessness. She endorses good appetite and good quality of sleep. Patient denies current active suicidal or homicidal ideations, denies any plans. Also, she denies any auditory and visual hallucinations. Patient reported treatment noncompliance with prescribed psychotropic medications, stated \"sometimes I skip medications for a week\". PSYCHIATRIC HISTORY:  Diagnoses: Depression, anxiety  Suicide attempts/gestures: Denies   Prior hospitalizations: Denies   Medication trials: Zoloft, Wellbutrin for 15 years, lorazepam  Mental health contact: Primary care provider manages patient's psychotropic medications. Head trauma: Denies    Past Medical History:    No past medical history on file. Past Surgical History:    No past surgical history on file. Medications Prior to Admission:   Medications Prior to Admission: pantoprazole sodium (PROTONIX) 40 MG PACK packet, Take 40 mg by mouth every morning (before breakfast)  montelukast (SINGULAIR) 10 MG tablet, Take 10 mg by mouth daily as needed  levothyroxine (SYNTHROID) 150 MCG tablet, Take 150 mcg by mouth Daily Take one tablet daily, except on Sundays take an extra 0.5 tablet  [DISCONTINUED] buPROPion (WELLBUTRIN SR) 150 MG extended release tablet, Take 150 mg by mouth 2 times daily    Allergies:  Latex    Social History:  The patient lives with her  for 50 years. She has 2 children 37and 52years old, and 4 grandkids. The patient denies history of smoking tobacco, drinking alcohol or using any illicit drugs. Family History:   No family history on file. Psychiatric Family History  No family history    REVIEW OF SYSTEMS:  General: No fevers, chills, night sweats, no recent weight loss or gain. Head: No headache, no migraine.   Eyes: No recent visual changes. Ears: No recent hearing changes. Nose: No increased congestion or change in sense of smell. Throat: No sore throat, no trouble swallowing. Cardiovascular: No chest pain or palpitations, or dizziness. Respiratory: No cough, wheezes, congestion, or shortness of breath. Gastrointestinal: No abdominal pain, nausea or vomiting, no diarrhea or constipation. Musculo-skeletal: No edema, deformities, or loss of functions. Neurological: No loss of consciousness, abnormal sensations, or weakness. Skin: No rash, abrasions or bruises. PHYSICAL EXAM:    Vitals:  /83   Pulse 74   Temp 97.7 °F (36.5 °C) (Temporal)   Resp 16   SpO2 96%     Mental Status Examination:    Appearance: Appropriately groomed, wearing casual civilian clothes. Made good eye contact. Behavior: Slightly anxious and irritable, frequently tearful through the interview, cooperative. Mild psychomotor agitation appreciated. Gait unremarkable. Speech: Normal in tone, volume, and quality. No pressured speech noted. Mood: \"Good\"   Affect: Mood congruent. Range is full  Thought Process: Mostly circumstantial, sometimes linear and goal oriented. Thought Content: Patient does not have any current active suicidal and homicidal ideations. No overt delusions or paranoia appreciated. Perceptions: Seems patient does not have any auditory or visual hallucinations at present time. Patient did not appear to be responding to internal stimuli. No overt psychosis. Executive Functions: Appear mildly impaired. Concentration: Slightly decreased  Reasoning: Appears impaired based on interaction from interview   Orientation: to person, place, date, and situation. Alert. Language: Intact. Fund of information: Intact. Memory: recent and remote appear intact.    Impulsivity: Limited  Neurovegitative: Fair appetite, fair sleep  Insight: Limited  Judgment: Impaired    Physical Exam:  GENERAL APPEARANCE: 73y.o. year-old female in NAD   HEAD: Normocephalic, atraumatic. THROAT: No erythema, exudates, lesions. No tongue laceration. CARDIOVASCULAR: PMI nondisplaced. Regular rhythm and rate. Normal S1 and S2.  PULMONARY: Clear to auscultation bilaterally, no tenderness to palpation. ABDOMEN: Soft, obese, nontender, nondistended. MUSCULOSKELTAL: No obvious deformities, clubbing, cyanosis or edema, no spinous process or paraspinous tenderness, normal ROM, normal gait, distal pulses intact symmetric 1 bilaterally. NEUROLOGICAL: Alert, oriented x 4, CN II-XII grossly intact, motor strength 3-4/5 all muscle groups, DTR 1+ intact and symmetric, sensation intact to sharp and dull. No abnormal movements or tremors.    SKIN: Warm, dry, intact, no rash, abrasions or bruises    DATA:  Labs:    CBC with Differential:    Lab Results   Component Value Date    WBC 5.3 01/13/2022    RBC 4.29 01/13/2022    HGB 13.0 01/13/2022    HCT 38.9 01/13/2022     01/13/2022    MCV 90.7 01/13/2022    MCH 30.3 01/13/2022    MCHC 33.4 01/13/2022    RDW 12.5 01/13/2022    LYMPHOPCT 36.7 01/13/2022    MONOPCT 11.1 01/13/2022    BASOPCT 0.9 01/13/2022    MONOSABS 0.60 01/13/2022    LYMPHSABS 2.0 01/13/2022    EOSABS 0.20 01/13/2022    BASOSABS 0.10 01/13/2022     CMP:    Lab Results   Component Value Date     01/13/2022    K 3.9 01/13/2022     01/13/2022    CO2 23 01/13/2022    BUN 14 01/13/2022    CREATININE 0.7 01/13/2022    GFRAA >59 01/13/2022    LABGLOM >60 01/13/2022    GLUCOSE 95 01/13/2022    PROT 6.2 01/10/2022    LABALBU 3.3 01/10/2022    CALCIUM 9.6 01/13/2022    BILITOT 0.3 01/10/2022    ALKPHOS 62 01/10/2022    AST 17 01/10/2022    ALT 11 01/10/2022       DSM 5 DIAGNOSIS:  Status post suicidal attempt by overdose of prescribed medications  Major depressive disorder, recurrent, severe, without psychotic features  Generalized anxiety disorder  Treatment noncompliance    Plan:   -Admit to [facility] and monitor on 15 minute checks  Cong Leo reviewed. -Gather collateral information from family with release  -Medical monitoring to be performed by Dr. Larsen Kearney and associates  -Acclimate to the unit. -Encourage participation in groups and therapeutic activities as appropriate.  -Medications: Will restart patient's home medications at previously prescribed and recommended dose, with an exception of we will discontinue lorazepam and Wellbutrin and will start patient on Effexor XR 75 mg once a day for depression and anxiety     Discussed benefits, alternatives, and risks including black box warnings of increased suicidality (though in children & young adults and lack of evidence of increased risk in those older 24 yrs. old), worsening affective symptoms, serotonin syndrome, NMS, seizures, nausea, headache, somnolence, insomnia, anorexia, yawning, sexual dysfunction & HTN.    Discussed medication may take up to 4 weeks for full effect.     -The risks, benefits, side effects, indications, contraindications, and adverse effects of the medications have been discussed.  -The patient has verbalized understanding and has capacity to give informed consent.  - help evaluating home environment.   -Discuss with treatment team.

## 2022-01-14 NOTE — PROGRESS NOTES
Psych  [facility] Daily Shift Assessment-Geriatric Unit  Nursing Progress Note          Room: [address] [place] [address]   Name: Liam Means   Age: 73 y.o. Gender: female   Dx: Depression intentional drug overdose. Precautions: suicide risk and fall risk  Inpatient Status: voluntary     SLEEP:    Sleep: Yes,   Sleep Quality Good   Hours Slept: 7   Sleep Medications: Yes  PRN Sleep Meds: No       MEDICAL:      Other PRN Meds: No   Med Compliant: Yes   Accu-Chek: No   Oxygen/CPAP/BiPAP: YES  CIWA/CINA: No   PAIN Assessment: present - adequately treated  Side Effects from medication: No    Is Patient experiencing any respiratory symptoms (headache, fever, body aches, cough. Meryle Sandman ): no  Patient educated by nursing to practice social distancing, wear masks, wash hands frequently: yes      Metabolic Screening:    No results found for: LABA1C    No results found for: CHOL  No results found for: TRIG  No results found for: HDL  No components found for: LDLCAL  No results found for: LABVLDL      There is no height or weight on file to calculate BMI.     BP Readings from Last 2 Encounters:   01/14/22 128/83   01/13/22 124/75         PSYCH:     SI denies suicidal ideation    HI Negative for homicidal ideation        AVH:Absent      Depression: 0 Anxiety: 0       GENERAL:      Appetite: poor  Social: No Speech: hesitant   Appearance:appropriately dressed, disheveled and healthy looking  Assistive Devices: noneLevel of Assist: Supervision      GROUP:    Group Participation: Yes  Participation LevelMinimal    Participation QualityAttentive    Notes:

## 2022-01-14 NOTE — PLAN OF CARE
Problem: Altered Mood, Depressive Behavior:  Goal: Able to verbalize acceptance of life and situations over which he or she has no control  Description: Able to verbalize acceptance of life and situations over which he or she has no control  Outcome: Ongoing  Note:                                                                     Group Therapy Note    Date: 1/14/2022  Start Time: 1000  End Time:  1100  Number of Participants: 5    Type of Group: Psychoeducation    Wellness Binder Information  Module Name:  Emotional Wellness  Session Number:  1    Group Goal for Pt: To raise awareness of the importance of healthy emotional expression    Notes:  Pt demonstrated improved awareness of the importance of healthy emotional expression by actively participating in group activity. Status After Intervention:  Unchanged    Participation Level:  Active Listener and Interactive    Participation Quality: Appropriate and Attentive      Speech:  normal      Thought Process/Content: Logical      Affective Functioning: Congruent      Mood: anxious and depressed      Level of consciousness:  Alert and Oriented x4      Response to Learning: Able to verbalize current knowledge/experience, Able to verbalize/acknowledge new learning, and Progressing to goal      Endings: None Reported    Modes of Intervention: Education      Discipline Responsible: Psychoeducational Specialist      Signature:  Griffin Reddy

## 2022-01-14 NOTE — PROGRESS NOTES
Admission Note      Reason for admission/Target Symptom: Patient admitted to [facility] due to  female who presents to the emergency department due to suspected overdose. Report is that patient was normal last night and this morning  went to check on her and she was minimally responsive. There was a bottle of Ativan at the bedside that has been half-full and is now nearly empty. No further history available at this time. Patient's drowsy. Moans and screams anytime we try to examine her. Waiting for  to arrive for more information.     Diagnoses: Depression NOS  UDS: Benzodiazepine, Opiate   BAL:  Neg    SW met with treatment team to discuss patient's treatment including care planning, discharge planning, and follow-up needs. Pt has been admitted to [facility]. Treatment team has identified patient's discharge needs as medication management and outpatient therapy/counseling. Pt confirmed  the need for ongoing treatment post inpatient stay. Pt was also provided a handout of contact information for drug and alcohol treatment centers and other community support service such as [facility], AA, and [facility].

## 2022-01-14 NOTE — PROGRESS NOTES
SW met with treatment team to discuss pt's progress and setbacks. SW 2 was present. Pt was admitted, voluntary, for depression, SI, suspected SA by OD on Ativan, pt reports she and her  were planning to commit suicide, but  \"backed out and it pissed me off, so I took the whole bottle of Ativan\", reports she decided to attempt suicide because she was thinking about different bad situations and decided to \"end it now before it gets bad\", denies HI/AVH. Since admission, pt reportedly was cooperative with admission process, alert/oriented x 4, reports she has been \"coming off a hormone regimen\" and \"started feeling something was astray, like I should be running from something\", denies depression/anxiety, denies SI/HI/AVH, continue current treatment plan.

## 2022-01-14 NOTE — PROGRESS NOTES
Behavioral Services  Medicare Certification Upon Admission    I certify that this patient's inpatient psychiatric hospital admission is medically necessary for:    [x] (1) Treatment which could reasonably be expected to improve this patient's condition,       [x] (2) Or for diagnostic study;     AND     [x](2) The inpatient psychiatric services are provided while the individual is under the care of a physician and are included in the individualized plan of care.     Estimated length of stay/service 5-7 days    Plan for post-hospital care TBD    Electronically signed by Hong Adkins MD on 1/14/2022 at 1:06 PM

## 2022-01-14 NOTE — H&P
HISTORY AND PHYSICAL    Jade Nichole is a 73 y.o.  female admitted from medical floor after overdose on BZO on 1/8/22. She did develop some aspiration pneumonia. She was medically stabilized and transferred to the [facility]. She is somewhat confused but answers most questions appropriately. She denies any A/V hallucinations. No SI or HI at this time. This is all the information the patient provides. HISTORY:    Patient Active Problem List   Diagnosis    Intentional drug overdose ([facility] [address]       No past medical history on file. No family history on file. Social History     Socioeconomic History    Marital status:      Spouse name: Not on file    Number of children: Not on file    Years of education: Not on file    Highest education level: Not on file   Occupational History    Not on file   Tobacco Use    Smoking status: Never Smoker    Smokeless tobacco: Not on file   Vaping Use    Vaping Use: Not on file   Substance and Sexual Activity    Alcohol use: Not on file    Drug use: Not on file    Sexual activity: Not on file   Other Topics Concern    Not on file   Social History Narrative    Not on file     Social Determinants of Health     Financial Resource Strain:     Difficulty of Paying Living Expenses: Not on file   Food Insecurity:     Worried About Running Out of Food in the Last Year: Not on file    Rosy of Food in the Last Year: Not on file   Transportation Needs:     Lack of Transportation (Medical): Not on file    Lack of Transportation (Non-Medical):  Not on file   Physical Activity:     Days of Exercise per Week: Not on file    Minutes of Exercise per Session: Not on file   Stress:     Feeling of Stress : Not on file   Social Connections:     Frequency of Communication with Friends and Family: Not on file    Frequency of Social Gatherings with Friends and Family: Not on file    Attends Worship Services: Not on file   [employer] of Clubs or Organizations: Not on file    Attends Club or Organization Meetings: Not on file    Marital Status: Not on file   Intimate Partner Violence:     Fear of Current or Ex-Partner: Not on file    Emotionally Abused: Not on file    Physically Abused: Not on file    Sexually Abused: Not on file   Housing Stability:     Unable to Pay for Housing in the Last Year: Not on file    Number of [place] in the Last Year: Not on file    Unstable Housing in the Last Year: Not on file       Prior to Admission medications    Medication Sig Start Date End Date Taking? Authorizing Provider   buPROPion (WELLBUTRIN SR) 150 MG extended release tablet Take 150 mg by mouth 2 times daily   Yes Historical Provider, MD   pantoprazole sodium (PROTONIX) 40 MG PACK packet Take 40 mg by mouth every morning (before breakfast)   Yes Historical Provider, MD   montelukast (SINGULAIR) 10 MG tablet Take 10 mg by mouth daily as needed   Yes Historical Provider, MD   levothyroxine (SYNTHROID) 150 MCG tablet Take 150 mcg by mouth Daily Take one tablet daily, except on Sundays take an extra 0.5 tablet   Yes Historical Provider, MD         Current Facility-Administered Medications:     acetaminophen (TYLENOL) tablet 650 mg, 650 mg, Oral, Q4H PRN, Annamaria Pedersen MD    polyethylene glycol [facility]) packet 17 g, 17 g, Oral, Daily PRN, Annamaria Pedersen MD    Latex    REVIEW OF SYSTEMS:    Review of Systems   Constitutional: Negative. HENT: Negative. Respiratory: Negative. Cardiovascular: Negative. Gastrointestinal: Negative. Genitourinary: Negative. Musculoskeletal: Negative. Skin: Negative. Neurological: Negative. Psychiatric/Behavioral: Positive for self-injury. Depression       PHYSICAL EXAM:    /62   Pulse 76   Temp 97.7 °F (36.5 °C) (Temporal)   Resp 16   SpO2 94%     Physical Exam  Vitals reviewed. Constitutional:       Appearance: Normal appearance. HENT:      Head: Normocephalic and atraumatic. Mouth/Throat:      Mouth: Mucous membranes are moist.      Pharynx: Oropharynx is clear. Eyes:      Extraocular Movements: Extraocular movements intact. Conjunctiva/sclera: Conjunctivae normal.      Pupils: Pupils are equal, round, and reactive to light. Cardiovascular:      Rate and Rhythm: Normal rate and regular rhythm. Pulses: Normal pulses. Heart sounds: Normal heart sounds. Pulmonary:      Effort: Pulmonary effort is normal. No respiratory distress. Breath sounds: Normal breath sounds. Abdominal:      General: Abdomen is flat. Bowel sounds are normal.      Palpations: Abdomen is soft. Musculoskeletal:         General: Normal range of motion. Cervical back: Normal range of motion and neck supple. Skin:     General: Skin is warm and dry. Capillary Refill: Capillary refill takes less than 2 seconds. Neurological:      General: No focal deficit present. Mental Status: She is alert and oriented to person, place, and time. Cranial Nerves: No cranial nerve deficit. Psychiatric:         Mood and Affect: Mood normal.         Behavior: Behavior normal.         Thought Content: Thought content normal.         Judgment: Judgment normal.         Recent Results (from the past 24 hour(s))   COVID-19, Rapid    Collection Time: 01/13/22 11:30 AM    Specimen: Nasopharyngeal Swab   Result Value Ref Range    SARS-CoV-2, NAAT Not Detected Not Detected         ASSESSMENT:  1. Depression with overdose  2. Hypothyroidism    PLAN:    1. She is admitted to [facility]. Lab results and home meds reviewed and have been started. VSS. Continue plan of car per psych. Alter treatment plan as needed.         IKE Lorenzo,   1/14/2022

## 2022-01-14 NOTE — PLAN OF CARE
Problem: Discharge Planning:  Goal: Discharged to appropriate level of care  Outcome: Ongoing     Problem: Health Maintenance - Impaired:  Goal: Maintenance of adequate nutrition will improve  Outcome: Ongoing     Problem: Mood - Altered:  Goal: Mood stable  Outcome: Ongoing     Problem: Violence - Risk of, Self/Other-Directed:  Goal: Knowledge of developmental care interventions  Outcome: Ongoing     Problem: Suicide risk  Description: Suicide risk  Goal: Provide patient with safe environment  Outcome: Ongoing     Problem: Altered Mood, Depressive Behavior:  Goal: Able to verbalize acceptance of life and situations over which he or she has no control  1/14/2022 1123 by Felipe Mcintyre  Outcome: Ongoing  Note:                                                                     Group Therapy Note    Date: 1/14/2022  Start Time: 1000  End Time:  1100  Number of Participants: 5    Type of Group: Psychoeducation    Wellness Binder Information  Module Name:  Emotional Wellness  Session Number:  1    Group Goal for Pt: To raise awareness of the importance of healthy emotional expression    Notes:  Pt demonstrated improved awareness of the importance of healthy emotional expression by actively participating in group activity. Status After Intervention:  Unchanged    Participation Level:  Active Listener and Interactive    Participation Quality: Appropriate and Attentive      Speech:  normal      Thought Process/Content: Logical      Affective Functioning: Congruent      Mood: anxious and depressed      Level of consciousness:  Alert and Oriented x4      Response to Learning: Able to verbalize current knowledge/experience, Able to verbalize/acknowledge new learning, and Progressing to goal      Endings: None Reported    Modes of Intervention: Education      Discipline Responsible: Psychoeducational Specialist      Signature:  Felipe Mcintyre

## 2022-01-14 NOTE — PLAN OF CARE
Problem: Non-Violent Restraints  Goal: Removal from restraints as soon as assessed to be safe  Outcome: Ongoing  Goal: No harm/injury to patient while restraints in use  Outcome: Ongoing  Goal: Patient's dignity will be maintained  Outcome: Ongoing     Problem: Discharge Planning:  Goal: Discharged to appropriate level of care  Description: Discharged to appropriate level of care  Outcome: Ongoing     Problem: Health Maintenance - Impaired:  Goal: Ability to perform activities of daily living will improve  Description: Ability to perform activities of daily living will improve  Outcome: Ongoing  Goal: Able to sleep without medication for appropriate length of time  Description: Able to sleep without medication for appropriate length of time  Outcome: Ongoing  Goal: Maintenance of adequate nutrition will improve  Description: Maintenance of adequate nutrition will improve  Outcome: Ongoing     Problem: Mood - Altered:  Goal: Mood stable  Description: Mood stable  Outcome: Ongoing     Problem: Self-Esteem - Low:  Goal: Demonstrates positive self-esteem  Description: Demonstrates positive self-esteem  Outcome: Ongoing     Problem: Violence - Risk of, Self/Other-Directed:  Goal: Knowledge of developmental care interventions  Description: Absence of violence  Outcome: Ongoing     Problem: Suicide risk  Goal: Provide patient with safe environment  Description: Provide patient with safe environment  Outcome: Ongoing

## 2022-01-14 NOTE — PROGRESS NOTES
Pt resting in bed at this time. Pt is alert and oriented x4. Pt denies depression, anxiety, suicidal ideation, homicidal ideation or hallucinations. Pt very vague during interview when asked why she was receiving care on the unit. \"I don't know, I've been coming off of a hormone regimen that my doctor had me on and started feeling something was astray, like I should be running from something. \" Pt unable to describe further. When pressed by this writer as to why the pt was in ICU recently, \"I'm not really sure, I don't know if I took pills or something I don't know what happened. \" Pt reports that she has not taken any scheduled medications besides her hormone regimen for over two years. Pt denies having any issues with sleep and reports, \"No I don't take anything and I can usually sleep for twelve hours a night. \" No distress noted. Will continue to monitor.

## 2022-01-14 NOTE — PROGRESS NOTES
Requirement Note     SW met with pt to complete Psychosocial and CSSR-S on this date. Patients long and short term goals discussed. Patient voiced understanding. Treatment plan sheet signed. Patient verbalized understanding of the treatment plan. Patient participated in goals and objectives of the treatment plan. Patient completed safety plan with , patient received copy of plan, and original was placed into patient's chart. SW explained treatment goals with pt. Decreasing depression and anxiety by improvement of positive coping patterns was discussed. Pt acknowledged understanding of treatment goals and signed treatment plan signature sheet. In the last 6 months has the pt been a danger to self: YES  In the last 6 months has the pt been a danger to others: NO  Legal Guardian/POA: NO     Provided patient with [employer] handout entitled \"Quitting Smoking. \"  Reviewed handout with patient: addressing dangers of smoking, developing coping skills, and providing basic information about quitting. Patient received all components practical counseling of tobacco practical counseling during the hospital stay.

## 2022-01-15 PROCEDURE — 6370000000 HC RX 637 (ALT 250 FOR IP): Performed by: NURSE PRACTITIONER

## 2022-01-15 PROCEDURE — 6370000000 HC RX 637 (ALT 250 FOR IP): Performed by: PSYCHIATRY & NEUROLOGY

## 2022-01-15 PROCEDURE — 99233 SBSQ HOSP IP/OBS HIGH 50: CPT | Performed by: PSYCHIATRY & NEUROLOGY

## 2022-01-15 PROCEDURE — 1240000000 HC EMOTIONAL WELLNESS R&B

## 2022-01-15 RX ORDER — DOCUSATE SODIUM 100 MG/1
100 CAPSULE, LIQUID FILLED ORAL 2 TIMES DAILY
Status: DISCONTINUED | OUTPATIENT
Start: 2022-01-15 | End: 2022-01-21 | Stop reason: HOSPADM

## 2022-01-15 RX ADMIN — LEVOTHYROXINE SODIUM 150 MCG: 0.07 TABLET ORAL at 06:20

## 2022-01-15 RX ADMIN — DOCUSATE SODIUM 100 MG: 100 CAPSULE ORAL at 09:10

## 2022-01-15 RX ADMIN — DOCUSATE SODIUM 100 MG: 100 CAPSULE ORAL at 21:12

## 2022-01-15 RX ADMIN — MONTELUKAST 10 MG: 10 TABLET, FILM COATED ORAL at 21:12

## 2022-01-15 RX ADMIN — PANTOPRAZOLE SODIUM 40 MG: 40 TABLET, DELAYED RELEASE ORAL at 06:20

## 2022-01-15 RX ADMIN — VENLAFAXINE HYDROCHLORIDE 75 MG: 75 CAPSULE, EXTENDED RELEASE ORAL at 07:28

## 2022-01-15 NOTE — PROGRESS NOTES
WRAP UP GROUP NOTE:     Patient's Goal:  Providing feedback as to their own progress in the care-plan provided. Pt's have an opportunity to explore self-reflective skills and share any additional cares and concerns not yet addressed. Pt effectively participated.      Energy level:HIGH  Appetite:NORMAL  Concentration: IMPROVING  Hallucinations:ON AND OFF  Depression:IMPROVING  Anxiety:ON AND OFF  How I worked today:WORKED HARD  What helps me sleep:READ  Any questions/complaints/comments:BLANK    BOXES CHECKED:     COMMUNITY GOALS:  GROUP ACTIVITIES  NURSING EDUCATION:  DISCUSSING PASSIVE AGGRESSIVE  LIFE SKILLS:  SELF ENHANCEMENT:   THERAPEUTIC RECREATION:  GROUP THROUGH ENTERTAINMENT  SEEING DOCTORS:  EXPLAINED ATIVAN USAGE  ONE TO ONE WITH STAFF:  SPOKE WITH [employer], ASSERRTIVE IS AGGRESSIVE  JOURNALING:  GOT STARTES  RELAXATION TRAINING:  RELAXED

## 2022-01-15 NOTE — PROGRESS NOTES
B[facility] Daily Shift Assessment  Nursing Progress Note    [facility]    Room: [address] [place] [address]   Name: Lalo Palomo  Age: 73   Gender: F   Dx: Drug overdose  Precautions: suicide  Target Symptoms: suicidal thought   Accu-Chek: no  Sleep Quality: 8 hrs  SI: no  HI: no  AVH: no  ADLs: yes  Speech: clear  Depression: 0  Anxiety: 0  Participation Quality: high  Appetite: 75%  Respiratory symptoms: no  Headache: no  Body aches: no  Fever: no  Cough: no  Patients encouraged to wear masks / wash hands frequently / practice social distancing: yes  Visitation: n/a  Complaints: no    Notes: Patient cooperative, affect bright, thought organized, self care done, taking medication, participating in activities, reports no problems. While completing safety plan, we talked about her situation, and she stated regret for the attempt to kill herself, after having had time to reconsider the effect it would have on her family. We also discussed the struggle to deal with loss, and she stated in the future she wants to slow down and think about better ways to cope, and to ask those close to her for emotional help. I encouraged her to use that strategy for the good of her self and her family.     Signature: Kaiser Lewis RN

## 2022-01-15 NOTE — PLAN OF CARE
Problem: Non-Violent Restraints  Goal: Removal from restraints as soon as assessed to be safe  Outcome: Ongoing  Goal: No harm/injury to patient while restraints in use  Outcome: Ongoing  Goal: Patient's dignity will be maintained  Outcome: Ongoing     Problem: Discharge Planning:  Goal: Discharged to appropriate level of care  Description: Discharged to appropriate level of care  Outcome: Ongoing     Problem: Health Maintenance - Impaired:  Goal: Ability to perform activities of daily living will improve  Description: Ability to perform activities of daily living will improve  1/15/2022 0827 by Lorelei Jacobs RN  Outcome: Ongoing  1/14/2022 1959 by Bruno Garza RN  Outcome: Ongoing  Goal: Able to sleep without medication for appropriate length of time  Description: Able to sleep without medication for appropriate length of time  1/15/2022 0827 by Lorelei Jacobs RN  Outcome: Ongoing  1/14/2022 1959 by Bruno Garza RN  Outcome: Ongoing  Goal: Maintenance of adequate nutrition will improve  Description: Maintenance of adequate nutrition will improve  1/15/2022 0827 by Lorelei Jacobs RN  Outcome: Ongoing  1/14/2022 1959 by Bruno Garza RN  Outcome: Ongoing     Problem: Mood - Altered:  Goal: Mood stable  Description: Mood stable  1/15/2022 0827 by Lorelei Jacobs RN  Outcome: Ongoing  1/14/2022 1959 by Bruno Garza RN  Outcome: Ongoing     Problem: Self-Esteem - Low:  Goal: Demonstrates positive self-esteem  Description: Demonstrates positive self-esteem  1/15/2022 0827 by Lorelei Jacobs RN  Outcome: Ongoing  1/14/2022 1959 by Bruno Garza RN  Outcome: Ongoing     Problem: Violence - Risk of, Self/Other-Directed:  Goal: Knowledge of developmental care interventions  Description: Absence of violence  Outcome: Ongoing     Problem: Suicide risk  Goal: Provide patient with safe environment  Description: Provide patient with safe environment  1/15/2022 0827 by Lorelei Jacobs RN  Outcome: Ongoing  1/14/2022 1959 by Stefan Ruggiero RN  Outcome: Ongoing     Problem: Altered Mood, Depressive Behavior:  Goal: Able to verbalize acceptance of life and situations over which he or she has no control  Description: Able to verbalize acceptance of life and situations over which he or she has no control  1/15/2022 0827 by Betty Thomas RN  Outcome: Ongoing  1/14/2022 1959 by Stefan Ruggiero RN  Outcome: Ongoing     Problem: Falls - Risk of:  Goal: Will remain free from falls  Description: Will remain free from falls  1/15/2022 0827 by Betty Thomas RN  Outcome: Ongoing  1/14/2022 1959 by Stefan Ruggiero RN  Outcome: Ongoing  Goal: Absence of physical injury  Description: Absence of physical injury  1/15/2022 0827 by Betty Thomas RN  Outcome: Ongoing  1/14/2022 1959 by Stefan Ruggiero RN  Outcome: Ongoing

## 2022-01-15 NOTE — PROGRESS NOTES
B[facility] Daily Shift Assessment-Geriatric Unit  Nursing Progress Note          Room: [facility]-   Name: Esther Pinedo   Age: 73 y.o. Gender: female   Dx: <principal problem not specified>  Precautions: suicide risk and fall risk  Inpatient Status: voluntary     SLEEP:    Sleep: Yes,   Sleep Quality Good   Hours Slept:    Sleep Medications: No  PRN Sleep Meds: No       MEDICAL:      Other PRN Meds: Yes   Med Compliant: Yes   Accu-Chek: No   Oxygen/CPAP/BiPAP: No  CIWA/CINA: No   PAIN Assessment: none  Side Effects from medication: No    Is Patient experiencing any respiratory symptoms (headache, fever, body aches, cough. Adrian Estevez ): no  Patient educated by nursing to practice social distancing, wear masks, wash hands frequently: yes      Metabolic Screening:    No results found for: LABA1C    No results found for: CHOL  No results found for: TRIG  No results found for: HDL  No components found for: LDLCAL  No results found for: LABVLDL      There is no height or weight on file to calculate BMI. BP Readings from Last 2 Encounters:   01/14/22 130/77   01/13/22 124/75         PSYCH:     SI denies suicidal ideation    HI Negative for homicidal ideation        AVH:Absent      Depression: 1 Anxiety: 1       GENERAL:      Appetite: no change from normal  Social: Yes Speech: normal   Appearance:appropriately dressed  Assistive Devices: noneLevel of Assist: Independent      GROUP:    Group Participation: Yes  Participation LevelMinimal    Participation QualityAppropriate    Notes:   Patient was in her room at the beginning of this shift. She came out of her room this evening and has been some social with her peers. She worked on activities while out of her room and made a phone call. She reported low depression and low anxiety.          Electronically signed by Ryan Hancock RN on 1/15/22 at 2:26 AM CST

## 2022-01-15 NOTE — PROGRESS NOTES
Department of Psychiatry  Attending Progress Note - Geriatric      SUBJECTIVE:    73 y.o. female with previous psychiatric history of depression and anxiety disorder, who initially was admitted to medical services secondary to overdose by prescribed benzodiazepine medication with suicidal intention. The patient has been seen in my office. She reported that her condition currently is stable and her mood is \"good\" today. Again, patient asked if she can be discharged from the hospital today. She endorses good appetite and good quality of sleep during the last night, stated that she did not experience any difficulties to fall asleep or stay asleep. She is compliant with currently prescribed medications and denies any side effects. It seems that the patient significantly minimizes her current affective symptomatology, as she denies any  depression, anxiety or psychotic symptoms today, but during the interview she stated that her mood is improved and she is not so depressed as before. She attends group activities in the unit and participated in those activities. Patient is social with medical staff and other patients in the unit. She performed her ADLs today. She denies current active suicidal or homicidal ideations, denies any plans. Also, she denies any auditory and visual hallucinations. OBJECTIVE    Physical  VITALS:  /80   Pulse 73   Temp 97.6 °F (36.4 °C) (Temporal)   Resp 16   SpO2 96%   TEMPERATURE:  Current - Temp: 97.6 °F (36.4 °C);  Max - Temp  Av.5 °F (36.4 °C)  Min: 97.4 °F (36.3 °C)  Max: 97.6 °F (36.4 °C)  RESPIRATIONS RANGE: Resp  Av  Min: 16  Max: 16  PULSE RANGE: Pulse  Av.5  Min: 73  Max: 88  BLOOD PRESSURE RANGE:  Systolic (46ICX), ETN:313 , Min:120 , QFA:131   ; Diastolic (07DCS), EF, Min:77, Max:80    PULSE OXIMETRY RANGE: SpO2  Av %  Min: 96 %  Max: 96 %    Review of Systems: 14 point review of systems is negative    Psychological ROS: Negative    Mental Status Examination:   Appearance: Appropriately groomed, wearing casual civilian clothes. Made good eye contact. Behavior: Calm, cooperative and socially appropriate. No psychomotor retardation/agitation appreciated. Gait unremarkable. Speech: Normal in tone, volume, and quality. Mood: \"good\"   Affect: Mood congruent. Range is mildly restricted  Thought Process: Mostly linear and goal oriented, sometimes circumstantial.  Thought Content: Patient does not have any current active suicidal and homicidal ideations. No overt delusions or paranoia appreciated. Perceptions: Seems patient does not have any auditory or visual hallucinations at present time. Patient did not appear to be responding to internal stimuli. No overt psychosis. Orientation: to person, place, date, and situation. Alert. Impulsivity: Questionable  Neurovegitative: Good appetite, good sleep  Insight: Limited  Judgment: Limited    Data  No new lab test results to review    Medications  Current Facility-Administered Medications: acetaminophen (TYLENOL) tablet 650 mg, 650 mg, Oral, Q4H PRN  levothyroxine (SYNTHROID) tablet 150 mcg, 150 mcg, Oral, Daily  pantoprazole (PROTONIX) tablet 40 mg, 40 mg, Oral, QAM AC  montelukast (SINGULAIR) tablet 10 mg, 10 mg, Oral, Nightly  venlafaxine (EFFEXOR XR) extended release capsule 75 mg, 75 mg, Oral, Daily with breakfast  hydrOXYzine (ATARAX) tablet 25 mg, 25 mg, Oral, TID PRN  polyethylene glycol (GLYCOLAX) packet 17 g, 17 g, Oral, Daily PRN    ASSESSMENT AND PLAN    DSM 5 DIAGNOSIS:  Status post suicidal attempt by overdose of prescribed medications  Major depressive disorder, recurrent, severe, without psychotic features  Generalized anxiety disorder  Treatment noncompliance    Plan:   1. Psychiatric Medications:   Continue current psychotropic medications as recommended. Patient denies side effect of currently prescribed medications.   No changes with dose of prescribed psychotropic medications today. 2. Medical Issues:    Continue medical monitoring by Dr. Lance Shah and associates. 3. Disposition:    Discharge patient home when she will be in stable mental condition and adjustment psychotropic medications will be done.      Amount of time spent with patient:    35 minutes with greater than 50% of the time spent in counseling and collaboration of care

## 2022-01-16 PROCEDURE — 6370000000 HC RX 637 (ALT 250 FOR IP): Performed by: NURSE PRACTITIONER

## 2022-01-16 PROCEDURE — 6370000000 HC RX 637 (ALT 250 FOR IP): Performed by: PSYCHIATRY & NEUROLOGY

## 2022-01-16 PROCEDURE — 1240000000 HC EMOTIONAL WELLNESS R&B

## 2022-01-16 RX ADMIN — LEVOTHYROXINE SODIUM 150 MCG: 0.07 TABLET ORAL at 06:08

## 2022-01-16 RX ADMIN — VENLAFAXINE HYDROCHLORIDE 75 MG: 75 CAPSULE, EXTENDED RELEASE ORAL at 07:39

## 2022-01-16 RX ADMIN — DOCUSATE SODIUM 100 MG: 100 CAPSULE ORAL at 20:10

## 2022-01-16 RX ADMIN — DOCUSATE SODIUM 100 MG: 100 CAPSULE ORAL at 08:19

## 2022-01-16 RX ADMIN — MONTELUKAST 10 MG: 10 TABLET, FILM COATED ORAL at 20:10

## 2022-01-16 RX ADMIN — PANTOPRAZOLE SODIUM 40 MG: 40 TABLET, DELAYED RELEASE ORAL at 06:08

## 2022-01-16 NOTE — PLAN OF CARE
Problem: Non-Violent Restraints  Goal: Removal from restraints as soon as assessed to be safe  Outcome: Ongoing  Goal: No harm/injury to patient while restraints in use  Outcome: Ongoing  Goal: Patient's dignity will be maintained  Outcome: Ongoing     Problem: Discharge Planning:  Goal: Discharged to appropriate level of care  Description: Discharged to appropriate level of care  Outcome: Ongoing     Problem: Health Maintenance - Impaired:  Goal: Ability to perform activities of daily living will improve  Description: Ability to perform activities of daily living will improve  Outcome: Ongoing  Goal: Able to sleep without medication for appropriate length of time  Description: Able to sleep without medication for appropriate length of time  Outcome: Ongoing  Goal: Maintenance of adequate nutrition will improve  Description: Maintenance of adequate nutrition will improve  Outcome: Ongoing     Problem: Mood - Altered:  Goal: Mood stable  Description: Mood stable  Outcome: Ongoing     Problem: Self-Esteem - Low:  Goal: Demonstrates positive self-esteem  Description: Demonstrates positive self-esteem  Outcome: Ongoing     Problem: Violence - Risk of, Self/Other-Directed:  Goal: Knowledge of developmental care interventions  Description: Absence of violence  Outcome: Ongoing     Problem: Suicide risk  Goal: Provide patient with safe environment  Description: Provide patient with safe environment  Outcome: Ongoing     Problem: Altered Mood, Depressive Behavior:  Goal: Able to verbalize acceptance of life and situations over which he or she has no control  Description: Able to verbalize acceptance of life and situations over which he or she has no control  Outcome: Ongoing     Problem: Falls - Risk of:  Goal: Will remain free from falls  Description: Will remain free from falls  Outcome: Ongoing  Goal: Absence of physical injury  Description: Absence of physical injury  Outcome: Ongoing

## 2022-01-16 NOTE — GROUP NOTE
Group Therapy Note    Date: 1/16/2022    Group Start Time: 1500  Group End Time: 8443  Group Topic: Recreational    [facility]    Iveth Colon RN; Konstantin Trevino RN    Group Therapy Note                                                      Group Therapy Note    Date: 01/16/22  Start Time: 1500  End Time: 8856    Number of Participants: 5    Type of Group: Recreational    Patient's Goal: Participate and socialize with staff and peers while participating in [place]      Notes:      Participation Level: Interactive    Participation Quality: Appropriate    Speech:  normal    Thought Process/Content: Linear    Affective Functioning: Congruent    Mood:  calm and cooperative; socialized with peers and staff    Level of consciousness:  Alert and Oriented x4    Response to Learning: Able to verbalize current knowledge/experience, Able to verbalize/acknowledge new learning, Able to retain information    Modes of Intervention: Education and Support    Discipline Responsible: Registered Nurse    Signature:  Iveth Colon RN  Electronically signed by Iveth Colon RN on 1/16/2022 at 3:35 PM

## 2022-01-16 NOTE — GROUP NOTE
Group Therapy Note    Date: 1/16/2022    Group Start Time: 0800  Group End Time: 0830  Group Topic: [employer]    [facility]    Yaneth Beckmna RN; Jessica Briceño RN    Group Therapy Note                                                                        Group Therapy Note    Date: 01/16/22  Start Time: 0800  End Time: 0830    Number of Participants: 6    Type of Group: Community Meeting    Patient's Goal: \"writing\"      Notes:      Participation Level: Interactive    Participation Quality: Appropriate    Speech:  normal    Thought Process/Content: Linear    Affective Functioning: Congruent    Mood: depressed       Level of consciousness:  Alert and Oriented x4    Response to Learning: Able to verbalize current knowledge/experience, Able to verbalize/acknowledge new learning, Able to retain information    Modes of Intervention: Education and Support    Discipline Responsible: Registered Nurse    Signature:  Yaneth Beckman RN  Electronically signed by Yaneth Beckman RN on 1/16/2022 at 10:41 AM

## 2022-01-16 NOTE — GROUP NOTE
Group Therapy Note    Date: 1/16/2022    Group Start Time: 1215  Group End Time: 3301  Group Topic: Art Therapy     [facility]    Nidia Marinelli RN; Clay Nugent RN    Group Therapy Note                                                Group Therapy Note    Date: 01/16/22  Start Time: 1215  End Time: 1315    Number of Participants: 3    Type of Group: Art Therapy    Patient's Goal:  Increase relaxation and coping mechanisms through art therapy    Notes:      Participation Level: Interactive    Participation Quality: Appropriate    Speech:  normal    Thought Process/Content: Linear    Affective Functioning: Congruent    Mood:  calm, cooperative; social with peers    Level of consciousness:  Alert and Oriented x4    Response to Learning: Able to verbalize current knowledge/experience, Able to verbalize/acknowledge new learning, Able to retain information    Modes of Intervention: Education and Support    Discipline Responsible: Registered Nurse    Signature:  Nidia Marinelli RN  Electronically signed by Nidia Marinelli RN on 1/16/2022 at 1:18 PM

## 2022-01-16 NOTE — PROGRESS NOTES
[facility] Unit Daily Assessment  Nursing Progress Note    Room: 0619/619-01   Name: Kate Bueno   Age: 73 y.o. Gender: female   Dx: <principal problem not specified>  Precautions: suicide risk and fall risk  Inpatient Status: voluntary       SLEEP:    Sleep Quality Good  Sleep Medications: No   PRN Sleep Meds: No       MEDICAL:    Other PRN Meds: Yes   Med Compliant: Yes  Accu-Chek: No  Oxygen/CPAP/BiPAP: No  CIWA/CINA: No   PAIN Assessment: denies pain  Side Effects from medication: No    Is Patient experiencing any respiratory symptoms (headache, fever, body aches, cough. Eden Hai ): no  Patient educated by nursing to practice social distancing, wear masks, wash hands frequently: yes      PSYCH:    Depression: 1   Anxiety: 0   SI denies suicidal ideation   HI Negative for homicidal ideation      AVH:Absent      GENERAL:    Appetite: good    Social: Yes   Speech: normal   Appearance: appropriately dressed and appropriately groomed    GROUP:    Group Participation: Yes  Participation Quality: Interactive    Notes: Pt is alert and oriented, cooperative. Brightened affect, mood congruent. Pt talked to staff about discharge plans, asking what day she will be going home. Staff educated pt that she would see the doctor tomorrow after treatment team and follow-up appointments would be made prior to D/C. Able to focus during conversation. Thought processes are linear. Poor recent memory. Impaired insight and judgment. Reports good sleep and appetite. Rates depression 1, denies anxiety. Denies SI, HI, and AVH. Will continue to monitor.      Electronically signed by Iveth Colon RN on 1/16/22 at 10:31 AM CST

## 2022-01-16 NOTE — PLAN OF CARE
Problem: Non-Violent Restraints  Goal: Removal from restraints as soon as assessed to be safe  1/16/2022 1023 by Brenton Pires RN  Outcome: Ongoing  1/15/2022 2250 by Fercho Marmolejo RN  Outcome: Ongoing  Goal: No harm/injury to patient while restraints in use  1/16/2022 1023 by Brenton Pires RN  Outcome: Ongoing  1/15/2022 2250 by Fercho Marmolejo RN  Outcome: Ongoing  Goal: Patient's dignity will be maintained  1/16/2022 1023 by Brenton Pires RN  Outcome: Ongoing  1/15/2022 2250 by Fercho Marmolejo RN  Outcome: Ongoing     Problem: Discharge Planning:  Goal: Discharged to appropriate level of care  Description: Discharged to appropriate level of care  1/16/2022 1023 by Brenton Pires RN  Outcome: Ongoing  1/15/2022 2250 by Fercho Marmolejo RN  Outcome: Ongoing     Problem: Health Maintenance - Impaired:  Goal: Ability to perform activities of daily living will improve  Description: Ability to perform activities of daily living will improve  1/16/2022 1023 by Brenton Pires RN  Outcome: Ongoing  1/15/2022 2250 by Fercho Marmolejo RN  Outcome: Ongoing  Goal: Able to sleep without medication for appropriate length of time  Description: Able to sleep without medication for appropriate length of time  1/16/2022 1023 by Brenton Pires RN  Outcome: Ongoing  1/15/2022 2250 by Fercho Marmolejo RN  Outcome: Ongoing  Goal: Maintenance of adequate nutrition will improve  Description: Maintenance of adequate nutrition will improve  1/16/2022 1023 by Brenton Pires RN  Outcome: Ongoing  1/15/2022 2250 by Fercho Marmolejo RN  Outcome: Ongoing     Problem: Mood - Altered:  Goal: Mood stable  Description: Mood stable  1/16/2022 1023 by Brenton Pires RN  Outcome: Ongoing  1/15/2022 2250 by Fercho Marmolejo RN  Outcome: Ongoing     Problem: Self-Esteem - Low:  Goal: Demonstrates positive self-esteem  Description: Demonstrates positive self-esteem  1/16/2022 1023 by Brenton Pires RN  Outcome: Ongoing  1/15/2022 2250 by Renate Howell RN  Outcome: Ongoing     Problem: Violence - Risk of, Self/Other-Directed:  Goal: Knowledge of developmental care interventions  Description: Absence of violence  1/16/2022 1023 by Cynthia Barton RN  Outcome: Ongoing  1/15/2022 2250 by Renate Howell RN  Outcome: Ongoing     Problem: Suicide risk  Goal: Provide patient with safe environment  Description: Provide patient with safe environment  1/16/2022 1023 by Cynthia Barton RN  Outcome: Ongoing  1/15/2022 2250 by Renate Howell RN  Outcome: Ongoing     Problem: Altered Mood, Depressive Behavior:  Goal: Able to verbalize acceptance of life and situations over which he or she has no control  Description: Able to verbalize acceptance of life and situations over which he or she has no control  1/16/2022 1023 by Cynthia Barton RN  Outcome: Ongoing  1/15/2022 2250 by Renate Howell RN  Outcome: Ongoing     Problem: Falls - Risk of:  Goal: Will remain free from falls  Description: Will remain free from falls  1/16/2022 1023 by Cynthia Barton RN  Outcome: Ongoing  1/15/2022 2250 by Renate Howell RN  Outcome: Ongoing  Goal: Absence of physical injury  Description: Absence of physical injury  1/16/2022 1023 by Cynthia Barton RN  Outcome: Ongoing  1/15/2022 2250 by Renate Howell RN  Outcome: Ongoing

## 2022-01-16 NOTE — BH NOTE
WRAP UP GROUP NOTE    Patient's Goal:  Providing feedback as to their own progress in the care-plan provided. Patients have an opportunity to explore self-reflective skills and share any additional cares and concerns not yet addressed. Pt effectively participated. Energy Level:high  Appetite:good  Concentration:good  Hallucinations:gone  Depression:improved  Anxiety:gone  How I worked today:tried a lot  What helps me sleep:try a relaxation exercise, \"breathe\"  Any questions/complaints/comments:n/a    Group/activities that helped me today were:    Life Skills;   Self-Enhancement; \"read cookbooks\"  Seeing Doctor(s); \"met with Dr to discuss needs\"[place]  One-to-One with Staff; \"went over some history with my mental health\"  Relaxation Training; \"deep breathing\"    Electronically signed by Luis Armando Reaves RN on 1/15/2022 at 8:42 PM

## 2022-01-16 NOTE — BH NOTE
[facility] Daily Shift Assessment-Geriatric Unit  Nursing Progress Note          Room: [facility]/619-01   Name: Lola King   Age: 73 y.o. Gender: female   Dx: <principal problem not specified>  Precautions: suicide risk and fall risk  Inpatient Status: voluntary     SLEEP:    Sleep: Yes,   Sleep Quality Good   Hours Slept:    Sleep Medications: No  PRN Sleep Meds: No       MEDICAL:      Other PRN Meds: No   Med Compliant: Yes   Accu-Chek: No   Oxygen/CPAP/BiPAP: No  CIWA/CINA: No   PAIN Assessment: denies  Side Effects from medication: none voiced    Is Patient experiencing any respiratory symptoms (headache, fever, body aches, cough. Kelly Kid ): no  Patient educated by nursing to practice social distancing, wear masks, wash hands frequently: yes      Metabolic Screening:    No results found for: LABA1C    No results found for: CHOL  No results found for: TRIG  No results found for: HDL  No components found for: LDLCAL  No results found for: LABVLDL      There is no height or weight on file to calculate BMI. BP Readings from Last 2 Encounters:   01/15/22 131/79   01/13/22 124/75         PSYCH:     SI denies suicidal ideation    HI Negative for homicidal ideation        AVH:denies      Depression: 1 Anxiety: 1       GENERAL:      Appetite: good  Social: Yes Speech: normal   Appearance:appropriately dressed  Assistive Devices: noneLevel of Assist: Independent      GROUP:    Group Participation: Yes  Participation LevelNone    Participation QualityAppropriate    Notes: Pt was calm and cooperative with her interview tonight. Pt has a bright facial expression which is congruent with her mood. During VS and wrap-up group, pt discusses that she \"didn't think through how her actions would affect my  family. \" Pt states \"I'm a planner and an organizer and I just thought I could take care of things efficiently. \" Encouragement provided.  Education provided on different coping mechanisms for pt's future needs such as exercise, journaling, and talking her feelings through with her loved ones. Pt voices understanding. Pt rated her depression a 1 and her anxiety a 1. Pt denies SI, HI, and AVH. Pt voiced her desire to not receive the atarax tonight. No medication administered for anxiety per her request. Pt is resting quietly at this time. Will continue to monitor for safety as ordered.      Electronically signed by Fredis Riggins RN on 1/15/2022 at 11:03 PM

## 2022-01-17 PROCEDURE — 6370000000 HC RX 637 (ALT 250 FOR IP): Performed by: PSYCHIATRY & NEUROLOGY

## 2022-01-17 PROCEDURE — 1240000000 HC EMOTIONAL WELLNESS R&B

## 2022-01-17 PROCEDURE — 99233 SBSQ HOSP IP/OBS HIGH 50: CPT | Performed by: PSYCHIATRY & NEUROLOGY

## 2022-01-17 PROCEDURE — 6370000000 HC RX 637 (ALT 250 FOR IP): Performed by: NURSE PRACTITIONER

## 2022-01-17 RX ADMIN — VENLAFAXINE HYDROCHLORIDE 75 MG: 75 CAPSULE, EXTENDED RELEASE ORAL at 08:16

## 2022-01-17 RX ADMIN — LEVOTHYROXINE SODIUM 150 MCG: 0.07 TABLET ORAL at 06:07

## 2022-01-17 RX ADMIN — DOCUSATE SODIUM 100 MG: 100 CAPSULE ORAL at 20:16

## 2022-01-17 RX ADMIN — PANTOPRAZOLE SODIUM 40 MG: 40 TABLET, DELAYED RELEASE ORAL at 06:08

## 2022-01-17 RX ADMIN — MONTELUKAST 10 MG: 10 TABLET, FILM COATED ORAL at 20:16

## 2022-01-17 ASSESSMENT — PAIN DESCRIPTION - ORIENTATION: ORIENTATION: LOWER

## 2022-01-17 ASSESSMENT — PAIN DESCRIPTION - PAIN TYPE: TYPE: CHRONIC PAIN

## 2022-01-17 ASSESSMENT — PAIN DESCRIPTION - PROGRESSION: CLINICAL_PROGRESSION: NOT CHANGED

## 2022-01-17 ASSESSMENT — PAIN DESCRIPTION - LOCATION: LOCATION: BACK

## 2022-01-17 ASSESSMENT — PAIN DESCRIPTION - FREQUENCY: FREQUENCY: INTERMITTENT

## 2022-01-17 ASSESSMENT — PAIN SCALES - GENERAL: PAINLEVEL_OUTOF10: 0

## 2022-01-17 ASSESSMENT — PAIN SCALES - WONG BAKER: WONGBAKER_NUMERICALRESPONSE: 0

## 2022-01-17 ASSESSMENT — PAIN - FUNCTIONAL ASSESSMENT: PAIN_FUNCTIONAL_ASSESSMENT: PREVENTS OR INTERFERES SOME ACTIVE ACTIVITIES AND ADLS

## 2022-01-17 ASSESSMENT — PAIN DESCRIPTION - ONSET: ONSET: ON-GOING

## 2022-01-17 NOTE — PLAN OF CARE
Problem: Altered Mood, Depressive Behavior:  Goal: Able to verbalize acceptance of life and situations over which he or she has no control  Description: Able to verbalize acceptance of life and situations over which he or she has no control  1/17/2022 1113 by Hang Isbell  Outcome: Ongoing  Note:                                                                     Group Therapy Note    Date: 1/17/2022  Start Time: 1000  End Time:  1030  Number of Participants: 5    Type of Group: Cognitive Skills    Wellness Binder Information  Module Name:  Men's Issues  Session Number:  1    Group Goal for Pt: To improve knowledge of effective stress management techniques    Notes:  Pt demonstrated improved knowledge of effective stress management techniques by actively participating in group discussion. Status After Intervention:  Unchanged    Participation Level:  Active Listener    Participation Quality: Appropriate and Attentive      Speech:  normal      Thought Process/Content: Logical      Affective Functioning: Congruent      Mood: anxious and depressed      Level of consciousness:  Alert and Oriented x4      Response to Learning: Able to verbalize current knowledge/experience, Able to verbalize/acknowledge new learning, and Progressing to goal      Endings: None Reported    Modes of Intervention: Education      Discipline Responsible: Psychoeducational Specialist      Signature:  Hang Isbell

## 2022-01-17 NOTE — PLAN OF CARE
Problem: Health Maintenance - Impaired:  Goal: Ability to perform activities of daily living will improve  Description: Ability to perform activities of daily living will improve  1/16/2022 2328 by Anabel Orlando RN  Outcome: Ongoing  1/16/2022 1023 by Judie Cogan, RN  Outcome: Ongoing  Goal: Able to sleep without medication for appropriate length of time  Description: Able to sleep without medication for appropriate length of time  1/16/2022 2328 by Anabel Orlando RN  Outcome: Ongoing  1/16/2022 1023 by Judie Cogan, RN  Outcome: Ongoing  Goal: Maintenance of adequate nutrition will improve  Description: Maintenance of adequate nutrition will improve  1/16/2022 2328 by Anabel Orlando RN  Outcome: Ongoing  1/16/2022 1023 by Judie Cogan, RN  Outcome: Ongoing     Problem: Mood - Altered:  Goal: Mood stable  Description: Mood stable  1/16/2022 2328 by Anabel Orlando RN  Outcome: Ongoing  1/16/2022 1023 by Judie Cogan, RN  Outcome: Ongoing     Problem: Self-Esteem - Low:  Goal: Demonstrates positive self-esteem  Description: Demonstrates positive self-esteem  1/16/2022 2328 by Anabel Orlando RN  Outcome: Ongoing  1/16/2022 1023 by Judie Cogan, RN  Outcome: Ongoing     Problem: Violence - Risk of, Self/Other-Directed:  Goal: Knowledge of developmental care interventions  Description: Absence of violence  1/16/2022 2328 by Anabel Orlando RN  Outcome: Ongoing  1/16/2022 1023 by Judie Cogan, RN  Outcome: Ongoing     Problem: Suicide risk  Goal: Provide patient with safe environment  Description: Provide patient with safe environment  1/16/2022 2328 by Anabel Orlando RN  Outcome: Ongoing  1/16/2022 1023 by Judie Cogan, RN  Outcome: Ongoing     Problem: Altered Mood, Depressive Behavior:  Goal: Able to verbalize acceptance of life and situations over which he or she has no control  Description: Able to verbalize acceptance of life and situations over which he or she has no control  1/16/2022 2328 by Yajaira Turpin RN  Outcome: Ongoing  1/16/2022 1023 by Kecia King RN  Outcome: Ongoing     Problem: Falls - Risk of:  Goal: Will remain free from falls  Description: Will remain free from falls  1/16/2022 2328 by Yajaira Turpin RN  Outcome: Ongoing  1/16/2022 1023 by Kecia King RN  Outcome: Ongoing  Goal: Absence of physical injury  Description: Absence of physical injury  1/16/2022 2328 by Yajaira Turpin RN  Outcome: Ongoing  1/16/2022 1023 by Kecia King RN  Outcome: Ongoing

## 2022-01-17 NOTE — PROGRESS NOTES
SW met with treatment team to discuss pt's progress and setbacks. SW 2 was present. Pt reportedly slept well last night, without medications, appetite is good, minimal participation in scheduled group activities, social with peers/staff, independent with ADLS, compliant with medications, behavior has been cooperative, affect superficially bright, denies depression/anxiety, denies SI/HI/AVH, continue current treatment plan.

## 2022-01-17 NOTE — PLAN OF CARE
Problem: Altered Mood, Depressive Behavior:  Goal: Able to verbalize acceptance of life and situations over which he or she has no control  Description: Able to verbalize acceptance of life and situations over which he or she has no control  1/17/2022 1111 by Felipe Mcintyre  Outcome: Ongoing  Note:                                                                     Group Therapy Note    Date: 1/17/2022  Start Time: 0930  End Time:  1000  Number of Participants: 5    Type of Group: Psychoeducation    Wellness Binder Information  Module Name:  Relapse Prevention  Session Number:  5    Group Goal for Pt: To improve knowledge of relapse prevention strategies    Notes:  Pt demonstrated improved knowledge of relapse prevention strategies by actively participating in group discussion. Status After Intervention:  Unchanged    Participation Level:  Active Listener    Participation Quality: Appropriate and Attentive      Speech:  normal      Thought Process/Content: Logical      Affective Functioning: Congruent      Mood: anxious and depressed      Level of consciousness:  Alert and Oriented x4      Response to Learning: Able to verbalize current knowledge/experience, Able to verbalize/acknowledge new learning, and Progressing to goal      Endings: None Reported    Modes of Intervention: Education      Discipline Responsible: Psychoeducational Specialist      Signature:  Felipe Mcintyre

## 2022-01-17 NOTE — BH NOTE
BHI Daily Shift Assessment-Geriatric Unit  Nursing Progress Note          Room: [facility]619-01   Name: Sarah aZragoza   Age: 73 y.o. Gender: female   Dx: <principal problem not specified>  Precautions: suicide risk and fall risk  Inpatient Status: voluntary     SLEEP:    Sleep: Yes,   Sleep Quality Good   Hours Slept:    Sleep Medications: no  PRN Sleep Meds: No       MEDICAL:      Other PRN Meds: No   Med Compliant: Yes   Accu-Chek: No   Oxygen/CPAP/BiPAP: No  CIWA/CINA: No   PAIN Assessment: denies  Side Effects from medication: none voiced    Is Patient experiencing any respiratory symptoms (headache, fever, body aches, cough. Mack Martinez ): no  Patient educated by nursing to practice social distancing, wear masks, wash hands frequently: yes      Metabolic Screening:    No results found for: LABA1C    No results found for: CHOL  No results found for: TRIG  No results found for: HDL  No components found for: LDLCAL  No results found for: LABVLDL      There is no height or weight on file to calculate BMI. BP Readings from Last 2 Encounters:   01/16/22 (!) 145/68   01/13/22 124/75         PSYCH:     SI denies suicidal ideation    HI Negative for homicidal ideation        AVH:denies      Depression: denies Anxiety: denies       GENERAL:      Appetite: good  Social: Yes Speech: normal   Appearance:appropriately dressed  Assistive Devices: noneLevel of Assist: Independent      GROUP:    Group Participation: Yes  Participation LevelMinimal    Participation QualityAppropriate    Notes: Pt was calm and cooperative with her interview tonight. She has a bright affect and her mood is congruent. Pt denies anxiety and depression as well as SI, HI, and AVH. Pt is med compliant, social, and dressed appropriately. She has been resting well since going to bed. Monitoring for safety continues as ordered.      Electronically signed by Mirela Cooper RN on 1/17/2022 at 12:04 AM

## 2022-01-17 NOTE — BH NOTE
Department of Psychiatry  Attending Progress Note - Geriatric      SUBJECTIVE:    73 y. o. female with previous psychiatric history of depression and anxiety disorder, who initially was admitted to medical services secondary to overdose by prescribed benzodiazepine medication with suicidal intention. Patient has been seen in my office with presence of the . Patient reported that she feels stable and her mood is \"pretty good\" today. She endorses good appetite and good quality of sleep. Patient is compliant with currently prescribed medications and denies any side effects. She denies any affective symptomatology today, denies feeling of depression, anxiety or psychotic symptoms. Patient attends group activities in the unit and participates in those activities. She is social with medical staff and other patients in the unit. Patient performed her ADLs today. She denies current active suicidal or homicidal ideations, denies any plans. Also, she denies any auditory and visual hallucinations. At the end of the interview, patient asked to be discharged from the hospital today. However, she was informed that we were not able to obtain collateral information from patient's family, as she refused to sign release of information form. OBJECTIVE    Physical  VITALS:  /84   Pulse 50   Temp 97.3 °F (36.3 °C) (Temporal)   Resp 16   SpO2 95%   TEMPERATURE:  Current - Temp: 97.3 °F (36.3 °C);  Max - Temp  Av.5 °F (36.4 °C)  Min: 97.3 °F (36.3 °C)  Max: 97.6 °F (36.4 °C)  RESPIRATIONS RANGE: Resp  Av  Min: 16  Max: 18  PULSE RANGE: Pulse  Av  Min: 50  Max: 54  BLOOD PRESSURE RANGE:  Systolic (75HIZ), ASHLEY:979 , Min:124 , JKN:623   ; Diastolic (73JVZ), LEE:14, Min:68, Max:84    PULSE OXIMETRY RANGE: SpO2  Av.5 %  Min: 95 %  Max: 96 %    Review of Systems: 14 point review of systems is negative    Psychological ROS: Negative    Mental Status Examination:    Appearance: Appropriately groomed, wearing casual civilian clothes. Made intermittent eye contact. Behavior: Mildly withdrawn, slightly irritable, cooperative with interview. No psychomotor retardation or agitation appreciated. Gait unremarkable. Speech: Normal in tone, volume, and quality. Mood: \"pretty good\"   Affect: Mood congruent. Range is mildly restricted  Thought Process: Appears linear and goal oriented. Thought Content: Patient does not have any current active suicidal and homicidal ideations. No overt delusions or paranoia appreciated. Perceptions: Seems patient does not have any auditory or visual hallucinations at present time. Patient did not appear to be responding to internal stimuli. No overt psychosis. Orientation: to person, place, date, and situation. Alert. Impulsivity: Questionable  Neurovegitative: Fair appetite, fair sleep  Insight: Limited  Judgment: Limited    Data  No new lab test results to review    Medications  Current Facility-Administered Medications: docusate sodium (COLACE) capsule 100 mg, 100 mg, Oral, BID  acetaminophen (TYLENOL) tablet 650 mg, 650 mg, Oral, Q4H PRN  levothyroxine (SYNTHROID) tablet 150 mcg, 150 mcg, Oral, Daily  pantoprazole (PROTONIX) tablet 40 mg, 40 mg, Oral, QAM AC  montelukast (SINGULAIR) tablet 10 mg, 10 mg, Oral, Nightly  venlafaxine (EFFEXOR XR) extended release capsule 75 mg, 75 mg, Oral, Daily with breakfast  hydrOXYzine (ATARAX) tablet 25 mg, 25 mg, Oral, TID PRN  polyethylene glycol (GLYCOLAX) packet 17 g, 17 g, Oral, Daily PRN    ASSESSMENT AND PLAN    DSM 5 DIAGNOSIS:  Status post suicidal attempt by overdose of prescribed medications  Major depressive disorder, recurrent, severe, without psychotic features  Generalized anxiety disorder  Treatment noncompliance    Plan:   1. Psychiatric Medications:   Continue current psychotropic medications as recommended.  Patient denies side effect of currently prescribed medications.   No changes with dose of prescribed psychotropic medications today.     2. Medical Issues:    Continue medical monitoring by Dr. Roc Hanks and associates.       3.  Disposition:    Discharge patient home when she will be in stable mental condition and adjustment psychotropic medications will be done.     Amount of time spent with patient:   35 minutes with greater than 50% of the time spent in counseling and collaboration of care

## 2022-01-17 NOTE — PROGRESS NOTES
Collateral obtained from:  Natasha English Case [phone]    Immediate Stressors & Time Episode Began:  reported pt was recently confronted by both of her grown children about them not being happy with the way pt raised them.  reported pt took this news very badly and ended up overdosing on meds. Diagnosis/Hx of compliance with meds:  reported none and pt is compliant with meds. Tx Hx/Past hospitalizations:  reported none. Family hx of psychiatric issues:  reported pt's brother has some trouble in past.     Substance Abuse:  reported pt is every once in a while a social drinker. Pending Legal:  reported none. Safety Issues (Weapons? Hx of attempts):  reported no weapons in home and no hx of attempts. Support system/Medication Managed by: The importance of medication management and locking extra medication in a secured location was explained and reccommended to collateral.  reported he organizes pt's meds and then pt takes them. Who does the pt live with:  reported he and pt live together.      Electronically signed by Evon Walsh [facility] on 1/17/2022 at 10:06 AM

## 2022-01-17 NOTE — PROGRESS NOTES
Pt's daughter Gracie Severance called and reported her dad was not all the way honest when this writer called him for collateral. Daughter reported pt's brother went off on pt about how she parented. Daughter reported pt left a suicide note before overdosing. Daughter reported 35 years ago pt said she did not want to be old and would go out on her own terms. Daughter reported this suicide attempt did not surprise her at all because pt has talked about it for a long time. Daughter reported pt would never talk to a dr or therapist about anything and after daughter told dr about what happened with pt dr was floored. Daughter reported pt's father was diagnosed with narcicisstic personality disorder. Daughter reported pt's brother has been suicidal most of his life and had a mental break at one point. Daughter reported pt takes medications as prescribed as long as it is medications that she wants to take. Daughter reported pt did not want to take Ativan she just wanted it to have on hand so she could commit suicide. Daughter reported pt will smoke pot if someone brings it over. Daughter reported pt smokes pot a couple of times a year. Daughter reported there are no legal issues and no weapons in home. Daughter reported pt got rid of all her clothes and threw them in the trash along with her medications. Daughter reported it had snowed so trash did not get picked up so  was able to get some of pt's clothes and meds back from trash. Daughter reported pt wrote notes to all her friends and left them stacked on table and wanted daughter to mail them. Daughter reported she is not sure about past suicide attempts. Daughter reported pt's  also has some issues and has been diagnosed with some things. Daughter reported pt manages own meds. Daughter reported pt's  was going to kill himself with pt. Daughter reported they have been  for the past 50 years.  Daughter reported pt's  will not go against wife and make her mad about anything. Daughter reported she does not think pt will go to any type of counseling unless she is required. Daughter reported she is worried about picking pt up from hospital because she is scared that pt will pull the steering wheel and make them have a wreck. Daughter reported either pt's  or her  will be picking pt up. Daughter asked if pt could be picked up by ambulance and brought home. It was explained to daughter that pt can ambulate and sit in car so normally ambulance would not take pt home.      Electronically signed by Rosaura Pearce [facility] on 1/17/2022 at 11:09 AM

## 2022-01-17 NOTE — PLAN OF CARE
Problem: Non-Violent Restraints  Goal: Removal from restraints as soon as assessed to be safe  1/16/2022 2328 by Geovanna Duncan RN  Outcome: Ongoing  Goal: No harm/injury to patient while restraints in use  1/16/2022 2328 by Geovanna Duncan RN  Outcome: Ongoing  Goal: Patient's dignity will be maintained  1/16/2022 2328 by Geovanna Duncan RN  Outcome: Ongoing     Problem: Discharge Planning:  Goal: Discharged to appropriate level of care  1/16/2022 2328 by Geovanna Duncan RN  Outcome: Ongoing     Problem: Health Maintenance - Impaired:  Goal: Ability to perform activities of daily living will improve  1/17/2022 1253 by Saira Lee RN  Outcome: Ongoing  1/16/2022 2328 by Geovanna Duncan RN  Outcome: Ongoing  Goal: Able to sleep without medication for appropriate length of time  1/17/2022 1253 by Saira Lee RN  Outcome: Ongoing  1/16/2022 2328 by Geovanna Duncan RN  Outcome: Ongoing  Goal: Maintenance of adequate nutrition will improve  1/17/2022 1253 by Saira Lee RN  Outcome: Ongoing  1/16/2022 2328 by Geovanna Duncan RN  Outcome: Ongoing     Problem: Self-Esteem - Low:  Goal: Demonstrates positive self-esteem  1/16/2022 2328 by Geovanna Duncan RN  Outcome: Ongoing     Problem: Violence - Risk of, Self/Other-Directed:  Goal: Knowledge of developmental care interventions  1/17/2022 1253 by Saira Lee RN  Outcome: Ongoing  1/16/2022 2328 by Geovanna Duncan RN  Outcome: Ongoing     Problem: Suicide risk  Description: Suicide risk  Goal: Provide patient with safe environment  1/17/2022 1253 by Saira Lee RN  Outcome: Ongoing  1/16/2022 2328 by Geovanna Duncan RN  Outcome: Ongoing     Problem: Altered Mood, Depressive Behavior:  Goal: Able to verbalize acceptance of life and situations over which he or she has no control  1/17/2022 1113 by Dana Parra  Outcome: Ongoing  Note:                                                                     Group Therapy Note    Date: 1/17/2022  Start Time: 1000  End Time:  1030  Number of Participants: 5    Type of Group: Cognitive Skills    Wellness Binder Information  Module Name:  Men's Issues  Session Number:  1    Group Goal for Pt: To improve knowledge of effective stress management techniques    Notes:  Pt demonstrated improved knowledge of effective stress management techniques by actively participating in group discussion. Status After Intervention:  Unchanged    Participation Level: Active Listener    Participation Quality: Appropriate and Attentive      Speech:  normal      Thought Process/Content: Logical      Affective Functioning: Congruent      Mood: anxious and depressed      Level of consciousness:  Alert and Oriented x4      Response to Learning: Able to verbalize current knowledge/experience, Able to verbalize/acknowledge new learning, and Progressing to goal      Endings: None Reported    Modes of Intervention: Education      Discipline Responsible: Psychoeducational Specialist      Signature:  Adalberto Lewis    1/17/2022 1111 by Adalberto Lewis  Outcome: Ongoing  Note:                                                                     Group Therapy Note    Date: 1/17/2022  Start Time: 0930  End Time:  1000  Number of Participants: 5    Type of Group: Psychoeducation    Wellness Binder Information  Module Name:  Relapse Prevention  Session Number:  5    Group Goal for Pt: To improve knowledge of relapse prevention strategies    Notes:  Pt demonstrated improved knowledge of relapse prevention strategies by actively participating in group discussion. Status After Intervention:  Unchanged    Participation Level:  Active Listener    Participation Quality: Appropriate and Attentive      Speech:  normal      Thought Process/Content: Logical      Affective Functioning: Congruent      Mood: anxious and depressed      Level of consciousness:  Alert and Oriented x4      Response to Learning: Able to verbalize current knowledge/experience, Able to verbalize/acknowledge new learning, and Progressing to goal      Endings: None Reported    Modes of Intervention: Education      Discipline Responsible: Psychoeducational Specialist      Signature:  Jaya Davidson    1/16/2022 2328 by Laurie Smiley RN  Outcome: Ongoing

## 2022-01-17 NOTE — PROGRESS NOTES
BHI Daily Shift Assessment-Geriatric Unit  Nursing Progress Note          Room: [facility]619-   Name: Liam Means   Age: 73 y.o. Gender: female   Dx: Depression, suicidal, overdose. Precautions: suicide risk and fall risk  Inpatient Status: voluntary     SLEEP:    Sleep: Yes,   Sleep Quality Fair   Hours Slept: 8   Sleep Medications: No  PRN Sleep Meds: No       MEDICAL:      Other PRN Meds: No   Med Compliant: Yes   Accu-Chek: No   Oxygen/CPAP/BiPAP: No  CIWA/CINA: No   PAIN Assessment: present - adequately treated  Side Effects from medication: No    Is Patient experiencing any respiratory symptoms (headache, fever, body aches, cough. Meryle Sandman ): no  Patient educated by nursing to practice social distancing, wear masks, wash hands frequently: yes      Metabolic Screening:    No results found for: LABA1C    No results found for: CHOL  No results found for: TRIG  No results found for: HDL  No components found for: LDLCAL  No results found for: LABVLDL      There is no height or weight on file to calculate BMI.     BP Readings from Last 2 Encounters:   01/17/22 124/84   01/13/22 124/75         PSYCH:     SI denies suicidal ideation    HI Negative for homicidal ideation        AVH:Absent      Depression: 1 Anxiety: 0       GENERAL:      Appetite: good  Social: Yes Speech: normal   Appearance:appropriately dressed, appropriately groomed and healthy looking  Assistive Devices: noneLevel of Assist: Supervision      GROUP:    Group Participation: Yes  Participation LevelMinimal    Participation QualityAttentive    Notes:

## 2022-01-17 NOTE — BH NOTE
WRAP UP GROUP NOTE    Patient's Goal:  Providing feedback as to their own progress in the care-plan provided. Patients have an opportunity to explore self-reflective skills and share any additional cares and concerns not yet addressed. Pt effectively participated.     Energy Level:normal  Appetite:good  Concentration: good  Hallucinations:gone  Depression:\"gone\"  Anxiety:gone  How I worked today:tried a lot  What helps me sleep:read  Any questions/complaints/comments:na    Group/activities that helped me today were;    Life Skills; \"journaling\"  Therapeutic Reaction; \"playing bingo\"  One-to-One with Staff;   \"discussed reassuring\"  Relaxation Training; \"light exercise\"    Electronically signed by Daphnie Douglas RN on 1/16/2022 at 8:32 PM

## 2022-01-18 PROCEDURE — 6370000000 HC RX 637 (ALT 250 FOR IP): Performed by: NURSE PRACTITIONER

## 2022-01-18 PROCEDURE — 1240000000 HC EMOTIONAL WELLNESS R&B

## 2022-01-18 PROCEDURE — 6370000000 HC RX 637 (ALT 250 FOR IP): Performed by: PSYCHIATRY & NEUROLOGY

## 2022-01-18 PROCEDURE — 99233 SBSQ HOSP IP/OBS HIGH 50: CPT | Performed by: PSYCHIATRY & NEUROLOGY

## 2022-01-18 RX ADMIN — LEVOTHYROXINE SODIUM 150 MCG: 0.07 TABLET ORAL at 06:09

## 2022-01-18 RX ADMIN — VENLAFAXINE HYDROCHLORIDE 75 MG: 75 CAPSULE, EXTENDED RELEASE ORAL at 07:54

## 2022-01-18 RX ADMIN — MONTELUKAST 10 MG: 10 TABLET, FILM COATED ORAL at 20:59

## 2022-01-18 RX ADMIN — PANTOPRAZOLE SODIUM 40 MG: 40 TABLET, DELAYED RELEASE ORAL at 06:09

## 2022-01-18 RX ADMIN — DOCUSATE SODIUM 100 MG: 100 CAPSULE ORAL at 20:59

## 2022-01-18 RX ADMIN — DOCUSATE SODIUM 100 MG: 100 CAPSULE ORAL at 07:54

## 2022-01-18 NOTE — PROGRESS NOTES
WRAP UP GROUP NOTE:     Patient's Goal:  Providing feedback as to their own progress in the care-plan provided. Pt's have an opportunity to explore self-reflective skills and share any additional cares and concerns not yet addressed. Pt effectively participated.      Energy level: Normal  Appetite:Good  Concentration: Good  Hallucinations:Gone  Depression:Gone  Anxiety:Gone  How I worked today: Tried a lot  What helps me sleep: Relaxation exercise  Any questions/complaints/comments:Blank

## 2022-01-18 NOTE — PLAN OF CARE
Problem: Non-Violent Restraints  Goal: Removal from restraints as soon as assessed to be safe  Outcome: Ongoing     Problem: Non-Violent Restraints  Goal: No harm/injury to patient while restraints in use  Outcome: Ongoing     Problem: Non-Violent Restraints  Goal: Patient's dignity will be maintained  Outcome: Ongoing     Problem: Discharge Planning:  Goal: Discharged to appropriate level of care  Description: Discharged to appropriate level of care  Outcome: Ongoing     Problem: Health Maintenance - Impaired:  Goal: Ability to perform activities of daily living will improve  Description: Ability to perform activities of daily living will improve  1/17/2022 2314 by Fausto Pendleton LPN  Outcome: Ongoing     Problem: Health Maintenance - Impaired:  Goal: Able to sleep without medication for appropriate length of time  Description: Able to sleep without medication for appropriate length of time  1/17/2022 2314 by Fausto Pendleton LPN  Outcome: Ongoing     Problem: Health Maintenance - Impaired:  Goal: Maintenance of adequate nutrition will improve  Description: Maintenance of adequate nutrition will improve  1/17/2022 2314 by Fausto Pendleton LPN  Outcome: Ongoing     Problem: Mood - Altered:  Goal: Mood stable  Description: Mood stable  1/17/2022 2314 by Fausto Pendleton LPN  Outcome: Ongoing     Problem: Self-Esteem - Low:  Goal: Demonstrates positive self-esteem  Description: Demonstrates positive self-esteem  Outcome: Ongoing     Problem: Violence - Risk of, Self/Other-Directed:  Goal: Knowledge of developmental care interventions  Description: Absence of violence  1/17/2022 2314 by Fausto Pendleton LPN  Outcome: Ongoing     Problem: Suicide risk  Goal: Provide patient with safe environment  Description: Provide patient with safe environment  1/17/2022 2314 by Fausto Pendleton LPN  Outcome: Ongoing     Problem: Altered Mood, Depressive Behavior:  Goal: Able to verbalize acceptance of life and situations over which he or she has no control  Description: Able to verbalize acceptance of life and situations over which he or she has no control  1/17/2022 2314 by Malik Durbin LPN  Outcome: Ongoing     Problem: Falls - Risk of:  Goal: Will remain free from falls  Description: Will remain free from falls  Outcome: Ongoing     Problem: Falls - Risk of:  Goal: Absence of physical injury  Description: Absence of physical injury  Outcome: Ongoing

## 2022-01-18 NOTE — PLAN OF CARE
Problem: Altered Mood, Depressive Behavior:  Goal: Able to verbalize acceptance of life and situations over which he or she has no control  Description: Able to verbalize acceptance of life and situations over which he or she has no control  1/18/2022 1428 by Sheldon Diez  Outcome: Ongoing  Note:                                                                     Group Therapy Note    Date: 1/18/2022  Start Time: 1330  End Time:  1400  Number of Participants: 5    Type of Group: Recovery    Wellness Binder Information  Module Name:  Women's Issues  Session Number:  4    Group Goal for Pt: To raise awareness of how thoughts influence feelings    Notes:  Pt demonstrated improved awareness of how thoughts influence feelings by actively participating in group discussion. Status After Intervention:  Unchanged    Participation Level:  Active Listener and Interactive    Participation Quality: Appropriate and Attentive      Speech:  normal      Thought Process/Content: Logical      Affective Functioning: Congruent      Mood: anxious and depressed      Level of consciousness:  Alert and Oriented x4      Response to Learning: Able to verbalize current knowledge/experience, Able to verbalize/acknowledge new learning, and Progressing to goal      Endings: None Reported    Modes of Intervention: Education      Discipline Responsible: Psychoeducational Specialist      Signature:  Sheldon Diez

## 2022-01-18 NOTE — PLAN OF CARE
Problem: Non-Violent Restraints  Goal: Removal from restraints as soon as assessed to be safe  1/18/2022 0945 by Jorge Knapp RN  Outcome: Ongoing  1/17/2022 2314 by Renetta Mckenna LPN  Outcome: Ongoing  Goal: No harm/injury to patient while restraints in use  1/18/2022 0945 by Jorge Knapp RN  Outcome: Ongoing  1/17/2022 2314 by Renetta Mckenna LPN  Outcome: Ongoing  Goal: Patient's dignity will be maintained  1/18/2022 0945 by Jorge Knapp RN  Outcome: Ongoing  1/17/2022 2314 by Renetta Mckenna LPN  Outcome: Ongoing     Problem: Discharge Planning:  Goal: Discharged to appropriate level of care  Description: Discharged to appropriate level of care  1/18/2022 0945 by Jorge Knapp RN  Outcome: Ongoing  1/17/2022 2314 by Renetta Mckenna LPN  Outcome: Ongoing     Problem: Health Maintenance - Impaired:  Goal: Ability to perform activities of daily living will improve  Description: Ability to perform activities of daily living will improve  1/18/2022 0945 by Jorge Knapp RN  Outcome: Ongoing  1/17/2022 2314 by Renetta Mckenna LPN  Outcome: Ongoing  Goal: Able to sleep without medication for appropriate length of time  Description: Able to sleep without medication for appropriate length of time  1/18/2022 0945 by Jorge Knapp RN  Outcome: Ongoing  1/17/2022 2314 by Renetta Mckenna LPN  Outcome: Ongoing  Goal: Maintenance of adequate nutrition will improve  Description: Maintenance of adequate nutrition will improve  1/18/2022 0945 by Jorge Knapp RN  Outcome: Ongoing  1/17/2022 2314 by Renetta Mckenna LPN  Outcome: Ongoing     Problem: Mood - Altered:  Goal: Mood stable  Description: Mood stable  1/18/2022 0945 by Jorge Knapp RN  Outcome: Ongoing  1/17/2022 2314 by Renetta Mckenna LPN  Outcome: Ongoing     Problem: Self-Esteem - Low:  Goal: Demonstrates positive self-esteem  Description: Demonstrates positive self-esteem  1/18/2022 0945 by Jorge Knapp RN  Outcome: Ongoing  1/17/2022 2314 by Renetta Mckenna

## 2022-01-18 NOTE — PROGRESS NOTES
SW met with treatment team to discuss pt's progress and setbacks. SW 2 was present. Pt reportedly slept last night, without medications, appetite is good, attends scheduled group activities, minimally social with peers/staff, independent with ADLS, compliant with medications, behavior has been cooperative, affect superficially bright, denies depression/anxiety, denies SI/HI/AVH, SW will contact family members to make discharge plan, continue current treatment plan.

## 2022-01-18 NOTE — PROGRESS NOTES
[facility] Daily Shift Assessment-Geriatric Unit  Nursing Progress Note          Room: [facility]/619-01   Name: Jason Gutierrez   Age: 73 y.o. Gender: female   Dx: <principal problem not specified>  Precautions: suicide risk and fall risk  Inpatient Status: voluntary     SLEEP:   Sleep Medications: No  PRN Sleep Meds: No       MEDICAL:      Other PRN Meds: No   Med Compliant: Yes   Accu-Chek: No   Oxygen/CPAP/BiPAP: No  CIWA/CINA: No   PAIN Assessment: none  Side Effects from medication: No    Is Patient experiencing any respiratory symptoms (headache, fever, body aches, cough. Humza Punt ): no  Patient educated by nursing to practice social distancing, wear masks, wash hands frequently: yes      Metabolic Screening:    No results found for: LABA1C    No results found for: CHOL  No results found for: TRIG  No results found for: HDL  No components found for: LDLCAL  No results found for: LABVLDL      There is no height or weight on file to calculate BMI. BP Readings from Last 2 Encounters:   01/17/22 (!) 151/69   01/13/22 124/75         PSYCH:     SI denies suicidal ideation    HI Negative for homicidal ideation        AVH:Absent      Depression: 0 Anxiety: 0       GENERAL:      Appetite: no change from normal  Social: No Speech: normal   Appearance:appropriately dressed and healthy looking  Assistive Devices: noneLevel of Assist: Independent      GROUP:    Group Participation: Yes  Participation LevelInteractive    Participation QualityAppropriate    Notes:  Patient is  Alert and Oriented x4. Affect is  Brightened. . Eye contact is good. Patient   Was minimally social with peers. Patient is cooperative with staff and is compliant with medications. Patient denies SI,HI, and AVH. Patient reports their depression at 0 and anxiety at 0. Patient has participated in group wrap up.           Electronically signed by Renetta Mckenna LPN on 5/43/66 at 65:84 PM CST

## 2022-01-18 NOTE — PLAN OF CARE
Problem: Altered Mood, Depressive Behavior:  Goal: Able to verbalize acceptance of life and situations over which he or she has no control  Description: Able to verbalize acceptance of life and situations over which he or she has no control  1/18/2022 1138 by Radha Schneider  Outcome: Ongoing  Note:                                                                     Group Therapy Note    Date: 1/18/2022  Start Time: 1030  End Time:  1100  Number of Participants: 2    Type of Group: Cognitive Skills    Wellness Binder Information  Module Name:  Women's Issues  Session Number:  2    Group Goal for Pt: To raise awareness of the importance of a healthy self-esteem    Notes:  Pt demonstrated improved awareness of the importance of a healthy self-esteem by actively participating in group discussion. Status After Intervention:  Unchanged    Participation Level:  Active Listener    Participation Quality: Attentive      Speech:  normal      Thought Process/Content: Logical      Affective Functioning: Congruent      Mood: anxious and depressed      Level of consciousness:  Alert and Oriented x4      Response to Learning: Able to verbalize current knowledge/experience, Able to verbalize/acknowledge new learning, and Progressing to goal      Endings: None Reported    Modes of Intervention: Education      Discipline Responsible: Psychoeducational Specialist      Signature:  Radha Schneider

## 2022-01-18 NOTE — PROGRESS NOTES
Department of Psychiatry  Attending Progress Note - Geriatric      SUBJECTIVE:    73 y. o. female with previous psychiatric history of depression and anxiety disorder, who initially was admitted to medical services secondary to overdose by prescribed benzodiazepine medication with suicidal intention. Patient has been seen in treatment team room with presence of the patient's nurse. Patient reported that she is doing \"great\" and her mood is \"pretty good\" today. She reported good appetite and good quality of sleep during the last night, stated that she slept 8 hours and woke up rested well in the morning. Patient is compliant with currently prescribed medications and denies any side effects. She denies any affective symptomatology, denies any depression, anxiety or psychotic symptoms. Patient attends group activities in the unit and participates in those activities. However, it was reported that patient is very superficial during the group activities, she always deflect attention from her recent suicidal attempt and tried to talk about issues, which are not related to her current admission. With presence of the medical staff patient tried to be cheerful, however, patient was observed multiple times being isolated in her room or in the day room. Patient denies current active suicidal or homicidal ideations, denies any plans. Also, she denies any auditory and visual hallucinations. OBJECTIVE    Physical  VITALS:  /80   Pulse 75   Temp 97.3 °F (36.3 °C) (Temporal)   Resp 14   SpO2 97%   TEMPERATURE:  Current - Temp: 97.3 °F (36.3 °C);  Max - Temp  Av.1 °F (36.2 °C)  Min: 96.8 °F (36 °C)  Max: 97.3 °F (36.3 °C)  RESPIRATIONS RANGE: Resp  Av  Min: 14  Max: 18  PULSE RANGE: Pulse  Av  Min: 49  Max: 75  BLOOD PRESSURE RANGE:  Systolic (70XFB), ZVV:516 , Min:120 , LZE:247   ; Diastolic (99WTW), DGW:88, Min:69, Max:80    PULSE OXIMETRY RANGE: SpO2  Av %  Min: 95 %  Max: 97 %    Review of Systems: 14 point review of systems is negative    Psychological ROS: Negative    Mental Status Examination:   Appearance: Appropriately groomed, wearing casual civilian clothes. Made intermittent eye contact. Behavior: Slightly withdrawn, artificially bright, cooperative with interview. No psychomotor retardation or agitation appreciated. Gait unremarkable. Speech: Normal in tone, volume, and quality. Mood: \"Pretty good\"   Affect: Mood congruent. Range is full  Thought Process: Mostly circumstantial, sometimes tangential  Thought Content: Patient does not have any current active suicidal and homicidal ideations. No overt delusions or paranoia appreciated. Perceptions: Seems patient does not have any auditory or visual hallucinations at present time. Patient did not appear to be responding to internal stimuli. No overt psychosis. Orientation: to person, place, date, and situation. Alert. Impulsivity: Questionable.    Neurovegitative: Fair appetite, fair sleep  Insight: Limited  Judgment: Limited      Data  No new lab test results to review    Medications  Current Facility-Administered Medications: docusate sodium (COLACE) capsule 100 mg, 100 mg, Oral, BID  acetaminophen (TYLENOL) tablet 650 mg, 650 mg, Oral, Q4H PRN  levothyroxine (SYNTHROID) tablet 150 mcg, 150 mcg, Oral, Daily  pantoprazole (PROTONIX) tablet 40 mg, 40 mg, Oral, QAM AC  montelukast (SINGULAIR) tablet 10 mg, 10 mg, Oral, Nightly  venlafaxine (EFFEXOR XR) extended release capsule 75 mg, 75 mg, Oral, Daily with breakfast  hydrOXYzine (ATARAX) tablet 25 mg, 25 mg, Oral, TID PRN  polyethylene glycol (GLYCOLAX) packet 17 g, 17 g, Oral, Daily PRN    ASSESSMENT AND PLAN    DSM 5 DIAGNOSIS:  Status post suicidal attempt by overdose of prescribed medications  Major depressive disorder, recurrent, severe, without psychotic features  Generalized anxiety disorder  Treatment noncompliance     Plan:   1. Psychiatric Medications:   Continue current psychotropic medications as recommended. Patient denies side effect of currently prescribed medications. No changes with dose of prescribed psychotropic medications today.     2. Medical Issues:    Continue medical monitoring by Dr. Elsa Palacios and associates.       3.  Disposition:    Discharge patient home when she will be in stable mental condition and adjustment psychotropic medications will be done.     Amount of time spent with patient:   35 minutes with greater than 50% of the time spent in counseling and collaboration of care

## 2022-01-18 NOTE — PROGRESS NOTES
[facility] Daily Shift Assessment  Nursing Progress Note    [facility]    Room: [address] [place] [address]   Name: Yaron Mata  Age: 73   Gender: F   Dx: Depression  Precautions: suicide  Target Symptoms: depression   Accu-Chek: no  Sleep Quality: 8 hrs  SI: no  HI: no  AVH: no  ADLs: yes  Speech: clear  Depression: 0  Anxiety: mild  Participation Quality: high  Appetite: 75%  Respiratory symptoms: no  Headache: no  Body aches: no  Fever: no  Cough: no  Patients encouraged to wear masks / wash hands frequently / practice social distancing: yes  Visitation: n/a  Complaints: \"No one has talked to me about my situation since I came here\"    Notes: Patient cooperative, but seems suspicious of staff, affect appropriate to patient, thought organized but exhibiting symptoms resembling confabulation, participating in activities, taking medication, saw doctor, reports no medical problems.     Signature: Rashida Al RN

## 2022-01-19 PROCEDURE — 6370000000 HC RX 637 (ALT 250 FOR IP): Performed by: PSYCHIATRY & NEUROLOGY

## 2022-01-19 PROCEDURE — 1240000000 HC EMOTIONAL WELLNESS R&B

## 2022-01-19 PROCEDURE — 99233 SBSQ HOSP IP/OBS HIGH 50: CPT | Performed by: PSYCHIATRY & NEUROLOGY

## 2022-01-19 PROCEDURE — 6370000000 HC RX 637 (ALT 250 FOR IP): Performed by: NURSE PRACTITIONER

## 2022-01-19 RX ADMIN — MONTELUKAST 10 MG: 10 TABLET, FILM COATED ORAL at 20:38

## 2022-01-19 RX ADMIN — DOCUSATE SODIUM 100 MG: 100 CAPSULE ORAL at 07:38

## 2022-01-19 RX ADMIN — DOCUSATE SODIUM 100 MG: 100 CAPSULE ORAL at 20:37

## 2022-01-19 RX ADMIN — LEVOTHYROXINE SODIUM 150 MCG: 0.07 TABLET ORAL at 06:17

## 2022-01-19 RX ADMIN — VENLAFAXINE HYDROCHLORIDE 75 MG: 75 CAPSULE, EXTENDED RELEASE ORAL at 07:38

## 2022-01-19 RX ADMIN — PANTOPRAZOLE SODIUM 40 MG: 40 TABLET, DELAYED RELEASE ORAL at 06:17

## 2022-01-19 ASSESSMENT — PAIN SCALES - GENERAL: PAINLEVEL_OUTOF10: 0

## 2022-01-19 NOTE — PLAN OF CARE
Problem: Altered Mood, Depressive Behavior:  Goal: Able to verbalize acceptance of life and situations over which he or she has no control  Description: Able to verbalize acceptance of life and situations over which he or she has no control  1/19/2022 1141 by Brittany Cardenas  Outcome: Ongoing  Note:                                                                     Group Therapy Note    Date: 1/19/2022  Start Time: 1000  End Time:  1030  Number of Participants: 6    Type of Group: Psychoeducation    Wellness Binder Information  Module Name:  [address] [place] [address]  Session Number:  1    Group Goal for Pt: To improve knowledge of practical facts about depression    Notes:  Pt demonstrated improved knowledge of practical facts about depression by actively participating in group activity. Status After Intervention:  Unchanged    Participation Level:  Active Listener and Interactive    Participation Quality: Appropriate and Attentive      Speech:  normal      Thought Process/Content: Logical      Affective Functioning: Congruent      Mood: anxious and depressed      Level of consciousness:  Alert and Oriented x4      Response to Learning: Able to verbalize current knowledge/experience, Able to verbalize/acknowledge new learning, and Progressing to goal      Endings: None Reported    Modes of Intervention: Education      Discipline Responsible: Psychoeducational Specialist      Signature:  Brittany Cardenas

## 2022-01-19 NOTE — PLAN OF CARE
Problem: Altered Mood, Depressive Behavior:  Goal: Able to verbalize acceptance of life and situations over which he or she has no control  Description: Able to verbalize acceptance of life and situations over which he or she has no control  1/19/2022 1143 by Felipe Mcintyre  Outcome: Ongoing  Note:                                                                     Group Therapy Note    Date: 1/19/2022  Start Time: 1030  End Time:  1100  Number of Participants: 5    Type of Group: Cognitive Skills    Wellness Binder Information  Module Name:  Women's Issues  Session Number:  3    Group Goal for Pt: To improve knowledge of ways to cope with depression    Notes:  Pt demonstrated improved knowledge of ways to cope with depression by actively participating in group discussion. Status After Intervention:  Unchanged    Participation Level:  Active Listener and Interactive    Participation Quality: Appropriate and Attentive      Speech:  normal      Thought Process/Content: Logical      Affective Functioning: Congruent      Mood: anxious and depressed      Level of consciousness:  Alert and Oriented x4      Response to Learning: Able to verbalize current knowledge/experience, Able to verbalize/acknowledge new learning, and Progressing to goal      Endings: None Reported    Modes of Intervention: Education      Discipline Responsible: Psychoeducational Specialist      Signature:  Felipe Mcintyre

## 2022-01-19 NOTE — PROGRESS NOTES
WRAP UP GROUP NOTE:     Patient's Goal:  Providing feedback as to their own progress in the care-plan provided. Pt's have an opportunity to explore self-reflective skills and share any additional cares and concerns not yet addressed. Pt effectively participated.      Energy level:LOW  Appetite:NORMAL  Concentration: IMPROVING  Hallucinations:GONE  Depression:GONE  Anxiety:GONE  How I worked today:TRIED A LITTLE  What helps me sleep:READ, RELAXATION  Any questions/complaints/comments:BLANK    NURSING EDUCATION: STRESS MANAGEMENT  LIFE SKILLS:  10 LIFE PROBLEMS  SEEING DOCTOR ONE TO ONE:  ONE TO ONE WITH STAFF: DAY Cedillo  JOURNALING:

## 2022-01-19 NOTE — PLAN OF CARE
Problem: Violence - Risk of, Self/Other-Directed:  Goal: Knowledge of developmental care interventions  Outcome: Ongoing     Problem: Suicide risk  Description: Suicide risk  Goal: Provide patient with safe environment  Outcome: Ongoing     Problem: Altered Mood, Depressive Behavior:  Goal: Able to verbalize acceptance of life and situations over which he or she has no control  1/19/2022 1310 by Patel Winston RN  Outcome: Ongoing  1/19/2022 1143 by Melissa Torres  Outcome: Ongoing  Note:                                                                     Group Therapy Note    Date: 1/19/2022  Start Time: 1030  End Time:  1100  Number of Participants: 5    Type of Group: Cognitive Skills    Wellness Binder Information  Module Name:  Women's Issues  Session Number:  3    Group Goal for Pt: To improve knowledge of ways to cope with depression    Notes:  Pt demonstrated improved knowledge of ways to cope with depression by actively participating in group discussion. Status After Intervention:  Unchanged    Participation Level:  Active Listener and Interactive    Participation Quality: Appropriate and Attentive      Speech:  normal      Thought Process/Content: Logical      Affective Functioning: Congruent      Mood: anxious and depressed      Level of consciousness:  Alert and Oriented x4      Response to Learning: Able to verbalize current knowledge/experience, Able to verbalize/acknowledge new learning, and Progressing to goal      Endings: None Reported    Modes of Intervention: Education      Discipline Responsible: Psychoeducational Specialist      Signature:  Melissa Torres    1/19/2022 1141 by Melissa Torres  Outcome: Ongoing  Note:                                                                     Group Therapy Note    Date: 1/19/2022  Start Time: 1000  End Time:  1030  Number of Participants: 6    Type of Group: Psychoeducation    Wellness Binder Information  Module Name:  [facility]  Session Number:  1    Group Goal for Pt: To improve knowledge of practical facts about depression    Notes:  Pt demonstrated improved knowledge of practical facts about depression by actively participating in group activity. Status After Intervention:  Unchanged    Participation Level:  Active Listener and Interactive    Participation Quality: Appropriate and Attentive      Speech:  normal      Thought Process/Content: Logical      Affective Functioning: Congruent      Mood: anxious and depressed      Level of consciousness:  Alert and Oriented x4      Response to Learning: Able to verbalize current knowledge/experience, Able to verbalize/acknowledge new learning, and Progressing to goal      Endings: None Reported    Modes of Intervention: Education      Discipline Responsible: Psychoeducational Specialist      Signature:  Audrey Nicole       Problem: Falls - Risk of:  Goal: Will remain free from falls  Outcome: Met This Shift  Goal: Absence of physical injury  Outcome: Met This Shift     Problem: Falls - Risk of:  Goal: Absence of physical injury  Outcome: Met This Shift

## 2022-01-19 NOTE — PLAN OF CARE
Problem: Non-Violent Restraints  Goal: Removal from restraints as soon as assessed to be safe  Outcome: Completed  Goal: No harm/injury to patient while restraints in use  Outcome: Completed  Goal: Patient's dignity will be maintained  Outcome: Completed     Problem: Discharge Planning:  Goal: Discharged to appropriate level of care  Outcome: Ongoing     Problem: Health Maintenance - Impaired:  Goal: Ability to perform activities of daily living will improve  Outcome: Completed  Goal: Able to sleep without medication for appropriate length of time  Outcome: Completed  Goal: Maintenance of adequate nutrition will improve  Outcome: Completed     Problem: Mood - Altered:  Goal: Mood stable  Outcome: Ongoing     Problem: Self-Esteem - Low:  Goal: Demonstrates positive self-esteem  Outcome: Completed     Problem: Violence - Risk of, Self/Other-Directed:  Goal: Knowledge of developmental care interventions  Outcome: Ongoing     Problem: Suicide risk  Description: Suicide risk  Goal: Provide patient with safe environment  Outcome: Ongoing     Problem: Altered Mood, Depressive Behavior:  Goal: Able to verbalize acceptance of life and situations over which he or she has no control  1/19/2022 1310 by Lowell Turpin RN  Outcome: Ongoing  1/19/2022 1143 by Tate Sanchez  Outcome: Ongoing  Note:                                                                     Group Therapy Note    Date: 1/19/2022  Start Time: 1030  End Time:  1100  Number of Participants: 5    Type of Group: Cognitive Skills    Wellness Binder Information  Module Name:  Women's Issues  Session Number:  3    Group Goal for Pt: To improve knowledge of ways to cope with depression    Notes:  Pt demonstrated improved knowledge of ways to cope with depression by actively participating in group discussion. Status After Intervention:  Unchanged    Participation Level:  Active Listener and Interactive    Participation Quality: Appropriate and Attentive      Speech:  normal      Thought Process/Content: Logical      Affective Functioning: Congruent      Mood: anxious and depressed      Level of consciousness:  Alert and Oriented x4      Response to Learning: Able to verbalize current knowledge/experience, Able to verbalize/acknowledge new learning, and Progressing to goal      Endings: None Reported    Modes of Intervention: Education      Discipline Responsible: Psychoeducational Specialist      Signature:  Radha Schneider    1/19/2022 1141 by Radha Schneider  Outcome: Ongoing  Note:                                                                     Group Therapy Note    Date: 1/19/2022  Start Time: 1000  End Time:  1030  Number of Participants: 6    Type of Group: Psychoeducation    Wellness Binder Information  Module Name:  [address] [place] [address]  Session Number:  1    Group Goal for Pt: To improve knowledge of practical facts about depression    Notes:  Pt demonstrated improved knowledge of practical facts about depression by actively participating in group activity. Status After Intervention:  Unchanged    Participation Level:  Active Listener and Interactive    Participation Quality: Appropriate and Attentive      Speech:  normal      Thought Process/Content: Logical      Affective Functioning: Congruent      Mood: anxious and depressed      Level of consciousness:  Alert and Oriented x4      Response to Learning: Able to verbalize current knowledge/experience, Able to verbalize/acknowledge new learning, and Progressing to goal      Endings: None Reported    Modes of Intervention: Education      Discipline Responsible: Psychoeducational Specialist      Signature:  Radha Schneider       Problem: Falls - Risk of:  Goal: Will remain free from falls  Outcome: Met This Shift  Goal: Absence of physical injury  Outcome: Met This Shift

## 2022-01-19 NOTE — PLAN OF CARE
Problem: Health Maintenance - Impaired:  Goal: Ability to perform activities of daily living will improve  Description: Ability to perform activities of daily living will improve  1/18/2022 2238 by Lizeth Schwarz RN  Outcome: Ongoing     Problem: Health Maintenance - Impaired:  Goal: Able to sleep without medication for appropriate length of time  Description: Able to sleep without medication for appropriate length of time  1/18/2022 2238 by Lizeth Schwarz RN  Outcome: Ongoing     Problem: Health Maintenance - Impaired:  Goal: Maintenance of adequate nutrition will improve  Description: Maintenance of adequate nutrition will improve  1/18/2022 2238 by Lizeth Schwarz RN  Outcome: Ongoing     Problem: Mood - Altered:  Goal: Mood stable  Description: Mood stable  1/18/2022 2238 by Lizeth Schwarz RN  Outcome: Ongoing     Problem: Self-Esteem - Low:  Goal: Demonstrates positive self-esteem  Description: Demonstrates positive self-esteem  1/18/2022 2238 by Lizeth Schwarz RN  Outcome: Ongoing     Problem: Suicide risk  Description: Suicide risk  Goal: Provide patient with safe environment  Description: Provide patient with safe environment  1/18/2022 2238 by Lizeth Schwarz RN  Outcome: Ongoing     Problem: Altered Mood, Depressive Behavior:  Goal: Able to verbalize acceptance of life and situations over which he or she has no control  Description: Able to verbalize acceptance of life and situations over which he or she has no control  1/18/2022 2238 by Lizeth Schwarz RN  Outcome: Ongoing     Problem: Falls - Risk of:  Goal: Will remain free from falls  Description: Will remain free from falls  1/18/2022 2238 by Lizeth Schwarz RN  Outcome: Ongoing     Problem: Falls - Risk of:  Goal: Absence of physical injury  Description: Absence of physical injury  1/18/2022 2238 by Lizeth Schwarz RN  Outcome: Ongoing

## 2022-01-19 NOTE — PROGRESS NOTES
SW met with treatment team to discuss pt's progress and setbacks. SW 2 was present. Pt reportedly slept fair last night, without medications, appetite is good, attends scheduled group activities, isolative to self, independent with ADLS, compliant with medications, behavior has been cooperative, upset over the fact that she has to stay in the hospital for several more days, reports moderate depression/anxiety, denies SI/HI/AVH, continue current treatment plan.

## 2022-01-19 NOTE — PROGRESS NOTES
[facility] Daily Shift Assessment-Geriatric Unit  Nursing Progress Note          Room: [facility]/619-01   Name: Mark Gunter   Age: 73 y.o. Gender: female   Dx: <principal problem not specified>  Precautions: suicide risk and fall risk  Inpatient Status: voluntary     SLEEP:    Sleep: Yes,   Sleep Quality Fair   Hours Slept:    Sleep Medications: No  PRN Sleep Meds: No       MEDICAL:      Other PRN Meds: No   Med Compliant: Yes   Accu-Chek: No   Oxygen/CPAP/BiPAP: No  CIWA/CINA: No   PAIN Assessment: none  Side Effects from medication: No    Is Patient experiencing any respiratory symptoms (headache, fever, body aches, cough. Jorgito Awkward ): no  Patient educated by nursing to practice social distancing, wear masks, wash hands frequently: yes      Metabolic Screening:    No results found for: LABA1C    No results found for: CHOL  No results found for: TRIG  No results found for: HDL  No components found for: LDLCAL  No results found for: LABVLDL      There is no height or weight on file to calculate BMI. BP Readings from Last 2 Encounters:   01/18/22 123/65   01/13/22 124/75         PSYCH:     SI denies suicidal ideation    HI Negative for homicidal ideation        AVH:Absent      Depression: 6-7 Anxiety: 5       GENERAL:      Appetite: no change from normal  Social: No Speech: normal   Appearance:appropriately dressed  Assistive Devices: noneLevel of Assist: Independent      GROUP:    Group Participation: Yes  Participation LevelMinimal    Participation QualityAppropriate    Notes:   Patient has been sitting by herself coloring this evening in the dining room. She reported that today hasn't been good. She reported that she was told that she would be here another 7-10 days. She said that really upset her. She rated her depression a 6 - 7 and her anxiety a 5 tonight. She appears worried this evening. Patient was told that the doctor wanted what was best for her and maybe he felt she needed more time here.        Patient daughter

## 2022-01-19 NOTE — PROGRESS NOTES
Department of Psychiatry  Attending Progress Note - Geriatric      SUBJECTIVE:    73 y. o. female with previous psychiatric history of depression and anxiety disorder, who initially was admitted to medical services secondary to overdose by prescribed benzodiazepine medication with suicidal intention. Patient has been seen in treatment team room with presence of the patient's nurse. Patient reported that yesterday after our interview, she felt depressed and anxious, because she was under the impression that she has to stay in the hospital for a week or so. However, she was able to set goals and make plans about her recovery, stated that mentally she felt \"relieved\" after that. Patient endorses good appetite and good quality of sleep, stated that she did not experience any difficulties to fall asleep or stay asleep during the last night. She is compliant with currently prescribed medications and denies any side effects. Patient denies any feeling of depression, anxiety or psychotic symptoms today. She attends group activities in the unit and actively participated in those activities. Patient is social with medical staff and other patients in the unit. She performs her ADLs daily. Patient denies current active suicidal or homicidal ideations, denies any plans. Also, she denies any auditory and visual hallucinations. OBJECTIVE    Physical  VITALS:  /85   Pulse 53   Temp 96.4 °F (35.8 °C) (Temporal)   Resp 16   SpO2 97%   TEMPERATURE:  Current - Temp: 96.4 °F (35.8 °C);  Max - Temp  Av.6 °F (35.9 °C)  Min: 96.4 °F (35.8 °C)  Max: 96.8 °F (36 °C)  RESPIRATIONS RANGE: Resp  Av  Min: 16  Max: 18  PULSE RANGE: Pulse  Av  Min: 53  Max: 55  BLOOD PRESSURE RANGE:  Systolic (19AGU), MV , Min:123 , JPU:048   ; Diastolic (08QQO), RFH:93, Min:65, Max:85    PULSE OXIMETRY RANGE: SpO2  Av %  Min: 97 %  Max: 97 %    Review of Systems: 14 point review of systems is negative    Psychological ROS: Negative    Mental Status Examination:  Appearance: Appropriately groomed, wearing casual civilian clothes. Made good eye contact. Behavior: Calm, cooperative with interview, socially appropriate. No psychomotor retardation or agitation appreciated. Gait unremarkable. Speech: Normal in tone, volume, and quality. Mood: \"Better\"   Affect: Mood congruent. Range is full  Thought Process: Appears linear and goal oriented. Thought Content: Patient does not have any current active suicidal and homicidal ideations. No overt delusions or paranoia appreciated. Perceptions: Seems patient does not have any auditory or visual hallucinations at present time. Patient did not appear to be responding to internal stimuli. No overt psychosis. Orientation: to person, place, date, and situation. Alert. Impulsivity: Improved. Neurovegitative: Good appetite, good sleep  Insight: Limited  Judgment: Limited    Data  No new lab test results to review    Medications  Current Facility-Administered Medications: docusate sodium (COLACE) capsule 100 mg, 100 mg, Oral, BID  acetaminophen (TYLENOL) tablet 650 mg, 650 mg, Oral, Q4H PRN  levothyroxine (SYNTHROID) tablet 150 mcg, 150 mcg, Oral, Daily  pantoprazole (PROTONIX) tablet 40 mg, 40 mg, Oral, QAM AC  montelukast (SINGULAIR) tablet 10 mg, 10 mg, Oral, Nightly  venlafaxine (EFFEXOR XR) extended release capsule 75 mg, 75 mg, Oral, Daily with breakfast  hydrOXYzine (ATARAX) tablet 25 mg, 25 mg, Oral, TID PRN  polyethylene glycol (GLYCOLAX) packet 17 g, 17 g, Oral, Daily PRN    ASSESSMENT AND PLAN    DSM 5 DIAGNOSIS:  Status post suicidal attempt by overdose of prescribed medications  Major depressive disorder, recurrent, severe, without psychotic features  Generalized anxiety disorder  Treatment noncompliance     Plan:   1. Psychiatric Medications:   Continue current psychotropic medications as recommended.   Patient denies side effect of currently prescribed medications. No changes with dose of prescribed psychotropic medications today.     2. Medical Issues:    Continue medical monitoring by Dr. Eric Hassan and associates.       3. Disposition:    Discharge patient home when she will be in stable mental condition and adjustment psychotropic medications will be done.     Amount of time spent with patient:   35 minutes with greater than 50% of the time spent in counseling and collaboration of care.

## 2022-01-19 NOTE — PROGRESS NOTES
[facility] Daily Shift Assessment-Geriatric Unit  Nursing Progress Note          Room: [facility]19-01   Name: Marlin Hernandez   Age: 73 y.o. Gender: female   Dx: Depression, suicide attempt. Precautions: suicide risk and fall risk  Inpatient Status: voluntary     SLEEP:    Sleep: Yes,   Sleep Quality Very good   Hours Slept: 8   Sleep Medications: No  PRN Sleep Meds: No       MEDICAL:      Other PRN Meds: No   Med Compliant: Yes   Accu-Chek: No   Oxygen/CPAP/BiPAP: No  CIWA/CINA: No   PAIN Assessment: none  Side Effects from medication: No    Is Patient experiencing any respiratory symptoms (headache, fever, body aches, cough. Donzell Pyo ): no  Patient educated by nursing to practice social distancing, wear masks, wash hands frequently: yes      Metabolic Screening:    No results found for: LABA1C    No results found for: CHOL  No results found for: TRIG  No results found for: HDL  No components found for: LDLCAL  No results found for: LABVLDL      There is no height or weight on file to calculate BMI.     BP Readings from Last 2 Encounters:   01/19/22 130/85   01/13/22 124/75         PSYCH:     SI denies suicidal ideation    HI Negative for homicidal ideation        AVH:Absent      Depression: 3 Anxiety: 2       GENERAL:      Appetite: no change from normal  Social: No Speech: normal   Appearance:appropriately dressed, appropriately groomed, good hygiene and healthy looking  Assistive Devices: noneLevel of Assist: Supervision      GROUP:    Group Participation: Yes  Participation LevelInteractive    Participation QualityAttentive    Notes:

## 2022-01-20 LAB
TSH REFLEX FT4: 3.56 UIU/ML (ref 0.35–5.5)
VITAMIN B-12: 1137 PG/ML (ref 211–946)
VITAMIN D 25-HYDROXY: 32.1 NG/ML

## 2022-01-20 PROCEDURE — 6370000000 HC RX 637 (ALT 250 FOR IP): Performed by: NURSE PRACTITIONER

## 2022-01-20 PROCEDURE — 6370000000 HC RX 637 (ALT 250 FOR IP): Performed by: PSYCHIATRY & NEUROLOGY

## 2022-01-20 PROCEDURE — 99233 SBSQ HOSP IP/OBS HIGH 50: CPT | Performed by: PSYCHIATRY & NEUROLOGY

## 2022-01-20 PROCEDURE — 36415 COLL VENOUS BLD VENIPUNCTURE: CPT

## 2022-01-20 PROCEDURE — 82607 VITAMIN B-12: CPT

## 2022-01-20 PROCEDURE — 84443 ASSAY THYROID STIM HORMONE: CPT

## 2022-01-20 PROCEDURE — 82306 VITAMIN D 25 HYDROXY: CPT

## 2022-01-20 PROCEDURE — 1240000000 HC EMOTIONAL WELLNESS R&B

## 2022-01-20 RX ADMIN — PANTOPRAZOLE SODIUM 40 MG: 40 TABLET, DELAYED RELEASE ORAL at 06:18

## 2022-01-20 RX ADMIN — MONTELUKAST 10 MG: 10 TABLET, FILM COATED ORAL at 20:36

## 2022-01-20 RX ADMIN — DOCUSATE SODIUM 100 MG: 100 CAPSULE ORAL at 08:20

## 2022-01-20 RX ADMIN — ACETAMINOPHEN 650 MG: 325 TABLET ORAL at 20:36

## 2022-01-20 RX ADMIN — DOCUSATE SODIUM 100 MG: 100 CAPSULE ORAL at 20:36

## 2022-01-20 RX ADMIN — VENLAFAXINE HYDROCHLORIDE 75 MG: 75 CAPSULE, EXTENDED RELEASE ORAL at 08:20

## 2022-01-20 RX ADMIN — LEVOTHYROXINE SODIUM 150 MCG: 0.07 TABLET ORAL at 06:18

## 2022-01-20 ASSESSMENT — PAIN DESCRIPTION - PAIN TYPE
TYPE: ACUTE PAIN
TYPE: ACUTE PAIN

## 2022-01-20 ASSESSMENT — PAIN DESCRIPTION - LOCATION
LOCATION: HEAD
LOCATION: HEAD

## 2022-01-20 ASSESSMENT — PAIN DESCRIPTION - DESCRIPTORS
DESCRIPTORS: HEADACHE
DESCRIPTORS: ACHING

## 2022-01-20 ASSESSMENT — PAIN SCALES - WONG BAKER: WONGBAKER_NUMERICALRESPONSE: 0

## 2022-01-20 ASSESSMENT — PAIN SCALES - GENERAL
PAINLEVEL_OUTOF10: 3
PAINLEVEL_OUTOF10: 5

## 2022-01-20 ASSESSMENT — PAIN DESCRIPTION - ORIENTATION: ORIENTATION: ANTERIOR

## 2022-01-20 ASSESSMENT — PAIN DESCRIPTION - FREQUENCY: FREQUENCY: INTERMITTENT

## 2022-01-20 ASSESSMENT — PAIN - FUNCTIONAL ASSESSMENT: PAIN_FUNCTIONAL_ASSESSMENT: ACTIVITIES ARE NOT PREVENTED

## 2022-01-20 ASSESSMENT — PAIN DESCRIPTION - PROGRESSION: CLINICAL_PROGRESSION: GRADUALLY IMPROVING

## 2022-01-20 ASSESSMENT — PAIN DESCRIPTION - ONSET: ONSET: GRADUAL

## 2022-01-20 NOTE — PLAN OF CARE
Problem: Discharge Planning:  Goal: Discharged to appropriate level of care  Description: Discharged to appropriate level of care  Outcome: Ongoing     Problem: Mood - Altered:  Goal: Mood stable  Description: Mood stable  Outcome: Ongoing     Problem: Violence - Risk of, Self/Other-Directed:  Goal: Knowledge of developmental care interventions  Description: Absence of violence  Outcome: Ongoing     Problem: Suicide risk  Goal: Provide patient with safe environment  Description: Provide patient with safe environment  Outcome: Ongoing     Problem: Altered Mood, Depressive Behavior:  Goal: Able to verbalize acceptance of life and situations over which he or she has no control  Description: Able to verbalize acceptance of life and situations over which he or she has no control  Outcome: Ongoing     Problem: Falls - Risk of:  Goal: Will remain free from falls  Description: Will remain free from falls  Outcome: Ongoing  Goal: Absence of physical injury  Description: Absence of physical injury  Outcome: Ongoing

## 2022-01-20 NOTE — PLAN OF CARE
Problem: Altered Mood, Depressive Behavior:  Goal: Able to verbalize acceptance of life and situations over which he or she has no control  Description: Able to verbalize acceptance of life and situations over which he or she has no control  1/20/2022 1237 by Tesha Naylor  Outcome: Ongoing  Note:                                                                     Group Therapy Note    Date: 1/20/2022  Start Time: 1030  End Time:  1130  Number of Participants: 5    Type of Group: Psychoeducation    Wellness Binder Information  Module Name:  Emotional Wellness  Session Number:  1    Group Goal for Pt: To raise awareness of the importance of healthy emotional expression    Notes:  Pt demonstrated improved awareness of the importance of healthy emotional expression by actively participating in group activity. Status After Intervention:  Improved    Participation Level:  Active Listener and Interactive    Participation Quality: Appropriate and Attentive      Speech:  normal      Thought Process/Content: Logical      Affective Functioning: Congruent      Mood: anxious and depressed      Level of consciousness:  Alert and Oriented x4      Response to Learning: Able to verbalize current knowledge/experience, Able to verbalize/acknowledge new learning, and Progressing to goal      Endings: None Reported    Modes of Intervention: Education      Discipline Responsible: Psychoeducational Specialist      Signature:  Tesha Naylor

## 2022-01-20 NOTE — PROGRESS NOTES
WRAP UP GROUP NOTE:     Patient's Goal:  Providing feedback as to their own progress in the care-plan provided. Pt's have an opportunity to explore self-reflective skills and share any additional cares and concerns not yet addressed. Pt effectively participated.      Energy level:normal  Appetite:normal  Concentration: good  Hallucinations:gone  Depression:same  Anxiety:gone  How I worked today:tried a lot  What helps me sleep:work in my journal  Any questions/complaints/comments:pt did not answer

## 2022-01-20 NOTE — PLAN OF CARE
Problem: Discharge Planning:  Goal: Discharged to appropriate level of care  Description: Discharged to appropriate level of care  1/19/2022 2101 by Marie Buckley RN  Outcome: Ongoing  1/19/2022 1310 by Billy Yan RN  Outcome: Ongoing     Problem: Mood - Altered:  Goal: Mood stable  Description: Mood stable  1/19/2022 2101 by Marie Buckley RN  Outcome: Ongoing  1/19/2022 1310 by Billy Yan RN  Outcome: Ongoing     Problem: Violence - Risk of, Self/Other-Directed:  Goal: Knowledge of developmental care interventions  Description: Absence of violence  1/19/2022 2101 by Marie Buckley RN  Outcome: Ongoing  1/19/2022 1310 by Billy Yan RN  Outcome: Ongoing     Problem: Suicide risk  Goal: Provide patient with safe environment  Description: Provide patient with safe environment  1/19/2022 2101 by Marie Buckley RN  Outcome: Ongoing  1/19/2022 1310 by Billy Yan RN  Outcome: Ongoing     Problem: Altered Mood, Depressive Behavior:  Goal: Able to verbalize acceptance of life and situations over which he or she has no control  Description: Able to verbalize acceptance of life and situations over which he or she has no control  1/19/2022 2101 by Marie Buckley RN  Outcome: Ongoing  1/19/2022 1310 by Billy Yan RN  Outcome: Ongoing  1/19/2022 1143 by Felipe Mcintyre  Outcome: Ongoing  Note:                                                                     Group Therapy Note    Date: 1/19/2022  Start Time: 1030  End Time:  1100  Number of Participants: 5    Type of Group: Cognitive Skills    Wellness Binder Information  Module Name:  Women's Issues  Session Number:  3    Group Goal for Pt: To improve knowledge of ways to cope with depression    Notes:  Pt demonstrated improved knowledge of ways to cope with depression by actively participating in group discussion. Status After Intervention:  Unchanged    Participation Level:  Active Listener and Interactive    Participation Quality: Appropriate and Attentive      Speech:  normal      Thought Process/Content: Logical      Affective Functioning: Congruent      Mood: anxious and depressed      Level of consciousness:  Alert and Oriented x4      Response to Learning: Able to verbalize current knowledge/experience, Able to verbalize/acknowledge new learning, and Progressing to goal      Endings: None Reported    Modes of Intervention: Education      Discipline Responsible: Psychoeducational Specialist      Signature:  Eden Espinoza    1/19/2022 1141 by Eden Espinoza  Outcome: Ongoing  Note:                                                                     Group Therapy Note    Date: 1/19/2022  Start Time: 1000  End Time:  1030  Number of Participants: 6    Type of Group: Psychoeducation    Wellness Binder Information  Module Name:  [address] [place] [address]  Session Number:  1    Group Goal for Pt: To improve knowledge of practical facts about depression    Notes:  Pt demonstrated improved knowledge of practical facts about depression by actively participating in group activity. Status After Intervention:  Unchanged    Participation Level:  Active Listener and Interactive    Participation Quality: Appropriate and Attentive      Speech:  normal      Thought Process/Content: Logical      Affective Functioning: Congruent      Mood: anxious and depressed      Level of consciousness:  Alert and Oriented x4      Response to Learning: Able to verbalize current knowledge/experience, Able to verbalize/acknowledge new learning, and Progressing to goal      Endings: None Reported    Modes of Intervention: Education      Discipline Responsible: Psychoeducational Specialist      Signature:  Eden Espinoza       Problem: Falls - Risk of:  Goal: Will remain free from falls  Description: Will remain free from falls  1/19/2022 2101 by Ariadne Byrd RN  Outcome: Ongoing  1/19/2022 1310 by Ramona Estrella RN  Outcome: Met This Shift  Goal: Absence of physical injury  Description: Absence of physical injury  1/19/2022 2101 by Sarai Hawthorne RN  Outcome: Ongoing  1/19/2022 1310 by Patel Winston RN  Outcome: Met This Shift

## 2022-01-20 NOTE — PROGRESS NOTES
Pt is up in the television area. Pt reports that she \"feels so much better. \" \"What I did was so impulsive. \" Pt reports that her depression is \"fine. \" and denies anxiety, suicidal ideation, homicidal ideation or hallucinations. \"I know they think I'm too happy but depression really has nothing to do with it. I know they talked to my daughter and I think she made everything sound like I was talking about killing myself for a while which isn't true. \" Pt is not social with peers. Pt is polite during interaction. No distress noted. Will continue to monitor.

## 2022-01-20 NOTE — PROGRESS NOTES
[facility] Daily Shift Assessment  Nursing Progress Note    Room: 0619/619-01 Name: Darren Stiles Age: 73 y.o. Gender: female   Dx: <principal problem not specified>  Precautions: suicide risk  Target Symptoms:   Accu-Chek: NoSleep: Yes,Sleep Quality Good SI No AVH Denies   [facility]  ADLs: No Speech: normal Depression: 2 Anxiety: 0   Participation LevelActive Listener and Interactive  Appetite: Good  Respiratory symptoms: No Headache: No Body aches: No Fever: No Cough: No  Patients encouraged to wear masks, wash hands frequently and practice social distancing while on the unit: Yes  Visitation: No   Participation QualityAppropriate and Attentive    Complaints:    Notes: Patient seems in good mood this morning. Patient says she believes she will be leaving the unit tomorrow and patient is excited about this. Patient rates her depression as a 2 and denies all anxiety. Patient also denies SI, HI and AVH. Patient reports great sleep the night before saying that she got about 9 hours. Will continue to monitor for safety.      Signature:

## 2022-01-20 NOTE — PROGRESS NOTES
Department of Psychiatry  Attending Progress Note      SUBJECTIVE:    73 y. o. female with previous psychiatric history of depression and anxiety disorder, who initially was admitted to medical services secondary to overdose by prescribed benzodiazepine medication with suicidal intention. Patient has been seen in my office with presence of the patient's nurse. Patient reported that her condition significantly improved during this hospital stay and her mood is \"good\" today. Patient continues to report good appetite and good quality of sleep, stated that she slept all night during the last night. She is compliant with currently prescribed medications and denies any side effects. Patient denies any affective symptomatology today, denies any depression, anxiety and psychotic symptoms. She attends group activities in the unit and participates in those activities. She is social with medical staff and other patients in the unit. Patient performed her ADLs today and took a shower. She denies current active suicidal or homicidal ideations. Patient denies any auditory and visual hallucinations. She did not endorse any paranoid thoughts or delusions. OBJECTIVE    Physical  VITALS:  /75   Pulse 68   Temp 97.9 °F (36.6 °C) (Temporal)   Resp 20   Wt 191 lb (86.6 kg)   SpO2 95%   BMI 30.83 kg/m²   TEMPERATURE:  Current - Temp: 97.9 °F (36.6 °C); Max - Temp  Av.7 °F (36.5 °C)  Min: 97.5 °F (36.4 °C)  Max: 97.9 °F (36.6 °C)  RESPIRATIONS RANGE: Resp  Av  Min: 16  Max: 20  PULSE RANGE: Pulse  Av  Min: 60  Max: 68  BLOOD PRESSURE RANGE:  Systolic (82DFZ), CEA:619 , Min:126 , HQS:387   ; Diastolic (27HPM), USV:35, Min:59, Max:75    PULSE OXIMETRY RANGE: SpO2  Av.5 %  Min: 95 %  Max: 96 %    Review of Systems: 14 point review of systems is negative    Psychological ROS: Negative    Mental Status Examination:   Appearance: Appropriately groomed, wearing casual civilian clothes.  Made good eye contact. Behavior: Calm, cooperative, friendly, and socially appropriate. No psychomotor retardation/agitation appreciated. Gait unremarkable. Speech: Normal in tone, volume, and quality. Mood: \"good\"   Affect: Mood congruent. Range is full  Thought Process: Appears linear and goal oriented. Thought Content: Patient does not have any current active suicidal and homicidal ideations. No overt delusions or paranoia appreciated. Perceptions: Seems patient does not have any auditory or visual hallucinations at present time. Patient did not appear to be responding to internal stimuli. No overt psychosis. Orientation: to person, place, date, and situation. Alert. Impulsivity: Seems improved. Neurovegitative: Good appetite, good sleep  Insight: Limited  Judgment: Limited    Data  No new lab test results to review    Medications  Current Facility-Administered Medications: docusate sodium (COLACE) capsule 100 mg, 100 mg, Oral, BID  acetaminophen (TYLENOL) tablet 650 mg, 650 mg, Oral, Q4H PRN  levothyroxine (SYNTHROID) tablet 150 mcg, 150 mcg, Oral, Daily  pantoprazole (PROTONIX) tablet 40 mg, 40 mg, Oral, QAM AC  montelukast (SINGULAIR) tablet 10 mg, 10 mg, Oral, Nightly  venlafaxine (EFFEXOR XR) extended release capsule 75 mg, 75 mg, Oral, Daily with breakfast  hydrOXYzine (ATARAX) tablet 25 mg, 25 mg, Oral, TID PRN  polyethylene glycol (GLYCOLAX) packet 17 g, 17 g, Oral, Daily PRN    ASSESSMENT AND PLAN    DSM 5 DIAGNOSIS:  Status post suicidal attempt by overdose of prescribed medications  Major depressive disorder, recurrent, severe, without psychotic features  Generalized anxiety disorder  Treatment noncompliance     Plan:   1. Psychiatric Medications:   Continue current psychotropic medications as recommended.  The patient denies side effect of currently prescribed medications.   No changes with dose of prescribed psychotropic medications today.     2. Medical Issues:    Continue medical monitoring by Dr. Jesse Navarro and associates.       3. Disposition:    Discharge patient home when she will be in stable mental condition and adjustment psychotropic medications will be done.   Preliminary day of discharge is tomorrow on 01/21/2022     Amount of time spent with patient:   35 minutes with greater than 50% of the time spent in counseling and collaboration of care

## 2022-01-20 NOTE — PROGRESS NOTES
Progress Note  Lilli Genao  1/19/2022 9:33 PM  Subjective:   Admit Date:   1/13/2022      CC/ADMIT DX:       Interval History:   Reviewed overnight events and nursing notes. She has no new medical issues. I have reviewed all labs/diagnostics from the last 24hrs. ROS:   I have done a 10 point ROS and all are negative, except what is mentioned in the HPI. ADULT DIET; Regular; wants mountain dew with each meal x2. OK with speech therapy. Medications:      docusate sodium  100 mg Oral BID    levothyroxine  150 mcg Oral Daily    pantoprazole  40 mg Oral QAM AC    montelukast  10 mg Oral Nightly    venlafaxine  75 mg Oral Daily with breakfast           Objective:   Vitals: BP (!) 126/59   Pulse 60   Temp 97.5 °F (36.4 °C) (Temporal)   Resp 16   Wt 191 lb (86.6 kg)   SpO2 96%   BMI 30.83 kg/m²  No intake or output data in the 24 hours ending 01/19/22 2133  General appearance: alert and cooperative with exam  Extremities: extremities normal, atraumatic, no cyanosis or edema  Neurologic:  No obvious focal neurologic deficits. Skin: no rashes    Assessment and Plan: Active Problems:    Depression    Suicidal overdose, initial encounter [facility])  Resolved Problems:    * No resolved hospital problems. *    GERD    Hypothyroidism    Plan:  1. Continue present medication(s)   2. Follow with Psych  3. Additional labs ordered for am      Discharge planning:   her home     Reviewed treatment plans with the patient and/or family.              Electronically signed by Chris Morrow MD on 1/19/2022 at 9:33 PM

## 2022-01-20 NOTE — PROGRESS NOTES
SW met with treatment team to discuss pt's progress and setbacks. SW 2 was present. Pt reportedly slept well last night, without medications, appetite is good, attends scheduled group activities, minimally social with peers/staff, independent with ADLS, compliant with medications, behavior has been cooperative, denies depression/anxiety, denies SI/HI/AVH, tentative discharge Friday, continue current treatment plan.

## 2022-01-21 VITALS
WEIGHT: 190.6 LBS | TEMPERATURE: 97.3 F | RESPIRATION RATE: 18 BRPM | SYSTOLIC BLOOD PRESSURE: 116 MMHG | DIASTOLIC BLOOD PRESSURE: 75 MMHG | HEART RATE: 74 BPM | OXYGEN SATURATION: 97 % | BODY MASS INDEX: 30.76 KG/M2

## 2022-01-21 PROCEDURE — 99239 HOSP IP/OBS DSCHRG MGMT >30: CPT | Performed by: PSYCHIATRY & NEUROLOGY

## 2022-01-21 PROCEDURE — 6370000000 HC RX 637 (ALT 250 FOR IP): Performed by: PSYCHIATRY & NEUROLOGY

## 2022-01-21 PROCEDURE — 5130000000 HC BRIDGE APPOINTMENT

## 2022-01-21 PROCEDURE — 6370000000 HC RX 637 (ALT 250 FOR IP): Performed by: NURSE PRACTITIONER

## 2022-01-21 RX ORDER — DOCUSATE SODIUM 100 MG/1
100 CAPSULE, LIQUID FILLED ORAL 2 TIMES DAILY
Qty: 20 CAPSULE | Refills: 0 | Status: SHIPPED | OUTPATIENT
Start: 2022-01-21 | End: 2022-01-31

## 2022-01-21 RX ORDER — VENLAFAXINE HYDROCHLORIDE 75 MG/1
75 CAPSULE, EXTENDED RELEASE ORAL
Qty: 30 CAPSULE | Refills: 1 | Status: SHIPPED | OUTPATIENT
Start: 2022-01-22

## 2022-01-21 RX ADMIN — LEVOTHYROXINE SODIUM 150 MCG: 0.07 TABLET ORAL at 06:11

## 2022-01-21 RX ADMIN — PANTOPRAZOLE SODIUM 40 MG: 40 TABLET, DELAYED RELEASE ORAL at 06:11

## 2022-01-21 RX ADMIN — DOCUSATE SODIUM 100 MG: 100 CAPSULE ORAL at 08:17

## 2022-01-21 RX ADMIN — VENLAFAXINE HYDROCHLORIDE 75 MG: 75 CAPSULE, EXTENDED RELEASE ORAL at 08:17

## 2022-01-21 NOTE — GROUP NOTE
Group Therapy Note    Date: 1/21/2022    Group Start Time: 0800  Group End Time: 0830  Group Topic: [employer]    [facility]    Lalo Powell RN; Carol Vega RN    Group Therapy Note                                                                        Group Therapy Note    Date: 01/21/22  Start Time: 0800  End Time: 0830    Number of Participants: 5    Type of Group: Community Meeting    Patient's Goal:  \"returning home\"    Notes:      Participation Level: Interactive    Participation Quality: Appropriate    Speech:  normal    Thought Process/Content: Linear, goal-oriented    Affective Functioning: Congruent    Mood: anxious and depressed (rates depression 2, anxiety 1), cooperative    Level of consciousness:  Alert and Oriented x4    Response to Learning: Able to verbalize current knowledge/experience, Able to verbalize/acknowledge new learning, Able to retain information, and Capable of insight    Modes of Intervention: Education and Support    Discipline Responsible: Registered Nurse    Signature:  Lalo Powell RN  Electronically signed by Lalo Powell RN on 1/21/2022 at 9:12 AM

## 2022-01-21 NOTE — PROGRESS NOTES
Progress Note  Fany Carrasco  1/20/2022 11:13 PM  Subjective:   Admit Date:   1/13/2022      CC/ADMIT DX:       Interval History:   Reviewed overnight events and nursing notes. She denies any new medical complaints. I have reviewed all labs/diagnostics from the last 24hrs. ROS:   I have done a 10 point ROS and all are negative, except what is mentioned in the HPI. ADULT DIET; Regular; wants mountain dew with each meal x2. OK with speech therapy. Medications:      docusate sodium  100 mg Oral BID    levothyroxine  150 mcg Oral Daily    pantoprazole  40 mg Oral QAM AC    montelukast  10 mg Oral Nightly    venlafaxine  75 mg Oral Daily with breakfast           Objective:   Vitals: /75   Pulse 64   Temp 97.4 °F (36.3 °C) (Temporal)   Resp 18   Wt 191 lb (86.6 kg)   SpO2 95%   BMI 30.83 kg/m²  No intake or output data in the 24 hours ending 01/20/22 1023  General appearance: alert and cooperative with exam  Extremities: extremities normal, atraumatic, no cyanosis or edema  Neurologic:  No obvious focal neurologic deficits. Skin: no rashes    Assessment and Plan: Active Problems:    Depression    Suicidal overdose, initial encounter [facility])  Resolved Problems:    * No resolved hospital problems. *    GERD    Hypothyroidism    Plan:  1. Continue present medication(s)   2. Follow with Psych  3. She is medically stable. I will monitor for any changes or concerns. Discharge planning:   her home     Reviewed treatment plans with the patient and/or family.              Electronically signed by Prabhu Erwin MD on 1/20/2022 at 11:13 PM

## 2022-01-21 NOTE — PROGRESS NOTES
Discharge Note     Pt discharging on this date. Pt denies SI, HI, and AVH at this time. Pt reports improvement in behavior and is leaving unit in overall good condition. SW and pt discussed pt's follow up appointments and importance of attending appointments as scheduled, pt voiced understanding and agreement. Pt and SW also discussed pt safety plan and pt able to verbally identify: warning signs, coping strategies, places and people that help make the pt feel better/distract negative thoughts, friends/family/agencies/professionals the pt can reach out to in a crisis, and something that is important to the pt/worth living for. Pt provided the national suicide prevention hotline number (8-957-743-1691) as well as local community behavioral health [facility]) crisis number for emergencies (3-524.229.3668). Pt to follow up with: Mandy for an intake/therapy/med management appointment. Pt needs to call into office to schedule appointment. SW attempted to schedule but it was reported that pt would have to call to schedule follow up appointment. Pt and nursing staff notified. Referral to out patient tobacco cessation counseling treatment: Patient refused referral to outpatient tobacco cessation counseling. SW offered to assist pt with transportation, pt reports having transportation.      Electronically signed by Roberta Price, [address] on 1/21/2022 at 8:57 AM

## 2022-01-21 NOTE — PROGRESS NOTES
[facility]  Discharge Note  Bridge Appointment completed: Reviewed Discharge Instructions with patient. Patient verbalizes understanding and agreement with the discharge plan using the teachback method. Referral for Outpatient Tobacco Cessation Counseling, upon discharge (violet X if applicable and completed):    ( )  Hospital staff assisted patient to call Quit Line or faxed referral                                   during hospitalization                  ( )  Recognizing danger situations (included triggers and roadblocks), if not completed on admission                    ( )  Coping skills (new ways to manage stress, exercise, relaxation techniques, changing routine, distraction), if not completed on admission                                                           ( )  Basic information about quitting (benefits of quitting, techniques in how to quit, available resources, if not completed on admission  ( ) Referral for counseling faxed to [facility]   ( ) Patient refused referral  ( ) Patient refused counseling  ( ) Patient refused smoking cessation medication upon discharge  (X) Non-smoker    Vaccinations (violet X if applicable and completed):  (X) Patient states already received influenza vaccine elsewhere  ( ) Patient received influenza vaccine during this hospitalization  ( ) Patient refused influenza vaccine at this time      Pt discharged with followings belongings:   Dental Appliances: Uppers  Vision - Corrective Lenses: None  Hearing Aid: None  Jewelry: None  Body Piercings Removed: N/A  Clothing: Socks,Undergarments (Comment),Pajamas (2 pairs panties, 1 bra)  Were All Patient Medications Collected?: Not Applicable  Other Valuables: None   Belongings sent home with pt. No valuables located in safe or security. Patient left department with   via  , discharged to home/self-care.  Patient education on aftercare instructions: yes  Patient verbalize understanding of AVS: yes.  Suicidal Ideations? No AVH? denies HI?  Negative for homicidal ideation         Status EXAM upon discharge:  Status and Exam  Normal: Yes  Facial Expression: Brightened  Affect: Congruent  Level of Consciousness: Alert  Mood:Normal: Yes  Mood: Anxious,Depressed (rates depression 2, anxiety 1)  Motor Activity:Normal: Yes  Motor Activity:  (within normal limits)  Interview Behavior: Cooperative  Preception: [place] to Person,[place] to Time,[place] to Place,[place] to Situation  Attention:Normal: Yes  Attention:  (able to focus)  Thought Processes:  (linear/goal-oriented)  Thought Content:Normal: Yes  Thought Content:  (denies SI, HI, AVH)  Hallucinations: None  Delusions: No  Delusions:  (no delusions noted)  Memory:Normal: Yes  Memory:  (intact)  Insight and Judgment: Yes  Insight and Judgment:  (improved)  Present Suicidal Ideation: No  Present Homicidal Ideation: No     Electronically signed by Lj Lara RN on 1/21/2022 at 9:19 AM

## 2022-01-21 NOTE — BH NOTE
[facility] Daily Shift Assessment-Geriatric Unit  Nursing Progress Note          Room: [facility]/619-01   Name: Stef Caro   Age: 73 y.o. Gender: female   Dx: <principal problem not specified>  Precautions: suicide risk and fall risk  Inpatient Status: voluntary     SLEEP:    Sleep: Yes,   Sleep Quality Good   Hours Slept:    Sleep Medications: no  PRN Sleep Meds: No       MEDICAL:      Other PRN Meds: Yes; tylenol 650mg   Med Compliant: Yes   Accu-Chek: No   Oxygen/CPAP/BiPAP: No  CIWA/CINA: No   PAIN Assessment: receiving treatment  Side Effects from medication: none voiced    Is Patient experiencing any respiratory symptoms (headache, fever, body aches, cough. Thurl Mutton ): no  Patient educated by nursing to practice social distancing, wear masks, wash hands frequently: yes      Metabolic Screening:    No results found for: LABA1C    No results found for: CHOL  No results found for: TRIG  No results found for: HDL  No components found for: LDLCAL  No results found for: LABVLDL      Body mass index is 30.83 kg/m². BP Readings from Last 2 Encounters:   01/20/22 124/75   01/13/22 124/75         PSYCH:     SI denies suicidal ideation    HI Negative for homicidal ideation        AVH:denies      Depression: 2 Anxiety: denies       GENERAL:      Appetite: good  Social: Yes Speech: normal   Appearance:appropriately dressed  Assistive Devices: noneLevel of Assist: Independent      GROUP:    Group Participation: Yes  Participation LevelMinimal    Participation QualityAppropriate    Notes: Pt was calm and cooperative tonight with her interview. She has a bright affect. She is social, med compliant, and dressed appropriately. Pt denies anxiety, SI, HI, and AVH. She rates her depression a 2/10. Pt sat and colored in the dining area until laying down tonight. Pt was reading on materials she received in her group therapies after laying down. Pt states \"I really likes these. \" Pt had c/o a headache tonight.  Administered tylenol 650 as ordered by [facility]. Will continue to monitor for safety as ordered.      Electronically signed by Rubina Painting RN on 1/20/2022 at 10:02 PM

## 2022-01-21 NOTE — PLAN OF CARE
Group Therapy Note    Date: 1/21/2022  Start Time: 1000  End Time:  1030  Number of Participants: 5    Type of Group: Psychoeducation    Wellness Binder Information  Module Name:  Relapse Prevention  Session Number:  5    Group Goal for Pt: To improve knowledge of relapse prevention strategies    Notes:  Pt demonstrated improved knowledge of relapse prevention strategies by actively participating in group discussion. Status After Intervention:  Unchanged    Participation Level:  Active Listener and Interactive    Participation Quality: Appropriate and Attentive      Speech:  normal      Thought Process/Content: Logical      Affective Functioning: Congruent      Mood: anxious and depressed      Level of consciousness:  Alert and Oriented x4      Response to Learning: Able to verbalize current knowledge/experience, Able to verbalize/acknowledge new learning, and Progressing to goal      Endings: None Reported    Modes of Intervention: Education      Discipline Responsible: Psychoeducational Specialist      Signature:  Mellissa Sánchez

## 2022-01-21 NOTE — BH NOTE
Pt's daughter, Caitie Javed, called tonight at approximately [phone] requesting an update on her Mother's status. Authorization to release and obtain PHI form reviewed by this Nurse prior to providing information. Pt's daughter states \"I just want to make sure that the actual report is matching what she told my Father. \" A brief overview of pt's behaviors provided. Informed pt's daughter that her Mother was brightened tonight and doing well with groups and medications. Pt's daughter states \"I doubt she will be doing anything at home, but I'll be happy to be wrong. \"  Informed pt's daughter that there was a tentative discharge planned for tomorrow. Pt's daughter states, Linda Ty was not aware of that. \"     Electronically signed by Johan Huitron RN on 1/20/2022 at 10:55 PM

## 2022-01-21 NOTE — DISCHARGE INSTR - DIET

## 2022-01-21 NOTE — BH NOTE
WRAP UP GROUP NOTE    Patient's Goal:  Providing feedback as to their own progress in the care-plan provided. Patients have an opportunity to explore self-reflective skills and share any additional cares and concerns not yet addressed. Pt effectively participated.     Energy Level:low  Appetite:good  Concentration:good  Hallucinations:gone  Depression:same  Anxiety:gone  How I worked today:I achieved, tried a lot  What helps me sleep:try a relaxation exercise  Any questions/complaints/comments: blank    Group/activities that helped me today were:    Life Skills;\"coping with stress\"  Seeing Doctor(s)  One-to-One with Staff; \"talked to [employer] and [employer]\"  Journaling; \"grateful\"  Relaxation Training; \"Breathing\"    Electronically signed by Renee Baez RN on 1/20/2022 at 8:06 PM

## 2022-01-21 NOTE — PROGRESS NOTES
SW met with treatment team to discuss pt's progress and setbacks. SW 2 was present. Pt reportedly slept well last night, with out medications, appetite is good, attends scheduled group activities, social with peers/staff, independent with ADLS, compliant with medications, behavior has been calm/cooperative, affect bright, reports mild depression, denies anxiety, denies SI/HI/AVH, will be discharged today, follow-up appointments will be scheduled.

## 2022-01-21 NOTE — DISCHARGE SUMMARY
Discharge Summary     Patient ID:  Darren Stiles  [id number]  48 y.o.  1955    Admit date: 1/13/2022  Discharge date: 1/21/2022    Admitting Physician: Tita Feldman MD   Attending Physician: Tita Feldman MD  Discharge Provider: Alia Linda MD     Admission Diagnoses: Suicidal overdose, initial encounter [facility]) Marilee Soto    Discharge Diagnoses: Status post suicidal attempt by overdose of prescribed medications  Major depressive disorder, recurrent, severe, without psychotic features  Generalized anxiety disorder  Treatment noncompliance    Admission Condition: poor    Discharged Condition: stable    Indication for Admission: Status post suicidal attempt by prescribed medications    HPI:    The patient is a 73 y.o. female with previous psychiatric history of depression and anxiety disorder, who initially was admitted to medical services secondary to overdose by prescribed benzodiazepine medication with suicidal intention.     Patient has been seen in treatment team room with presence of the patient's nurse. Patient reported that she has been suffering from depression and anxiety disorder for more than 15 years and her primary care provider has been prescribed psychotropic medications to her. Patient reported that her depression became slightly better, however, she still experiences frequent episodes of anxiety.     She reported recent life stressors, stated that both her parents passed away last year. Patient stated \"COVID affected my life, I do not know how to continue my life with COVID around. I was thinking, it is easier to die\". Patient reported that she talked to her  and they both made the decision to die at the same day by committing suicide. She stated that they discuss how they are going to die and wrote letters to the family members. However, patient said that her  changed his mind, stated \"I guess he never wanted to kill himself.   He agreed with me, because he thought I will never do this\". Patient reported that she decided to kill herself and overdosed with prescribed benzodiazepines. She stated that she regrets about her decision, stated that she made the plans to go home and start a new life and asked to be discharged from the hospital today.     During the interview, patient denies any affective symptomatology, she denies feeling of depression, anxiety or psychotic symptoms. Patient denies feeling of hopelessness, helplessness or worthlessness. She endorses good appetite and good quality of sleep. Patient denies current active suicidal or homicidal ideations, denies any plans. Also, she denies any auditory and visual hallucinations.     Patient reported treatment noncompliance with prescribed psychotropic medications, stated \"sometimes I skip medications for a week\".       PSYCHIATRIC HISTORY:  Diagnoses: Depression, anxiety  Suicide attempts/gestures: Denies   Prior hospitalizations: Denies   Medication trials: Zoloft, Wellbutrin for 15 years, lorazepam  Mental health contact: Primary care provider manages patient's psychotropic medications. Head trauma: Denies       Hospital Course:   Patient was admitted to the adult psych behavioral health floor and was acclimated to the floor. Labs were reviewed and physical exam was completed by Dr. Eric Hassan and associates. Home medications were reconciled. CORI was obtained and reviewed. Medication changes were made and patient tolerated well with no side effects. During the hospital stay patient's home medications have been restarted at previously prescribed and recommended dose. Wellbutrin was discontinued due to lack of the effect. Lorazepam was discontinued due to contraindications of this medication for patient's condition. The patient has been started on Effexor XR 75 mg once a day for depression and anxiety.   The patient was provided with hydroxyzine 25 mg 3 times a day as needed for anxiety with positive effect. Patient attended and participated in groups. The patient did interact well with other patients and staff on the unit. Behaviorally she was not a problem. Patient was compliant with her medications. Patient was sleeping through the night. This patient is not suicidal, homicidal or psychotic at discharge. She does not present a danger to self or others. With the above mentioned medications changes as well as psychotherapeutic interventions, the patient reported considerable improvement in her condition. On 01/21/22 it was therefore decided to discharge the patient, as it was felt that she received maximal benefit from her hospitalization.       Number of antipsychotic medication prescribed at discharge: None  IF MORE THAN ONE IS USED: NA    History of greater than 3 failed multiple monotherapy trials: NA  Monotherapy taper plan/ cross taper in progress: NA  Augmentation of Clozapine: NA    Referral to addiction treatment: NA    Prescription for Alcohol or Drug Disorder Medication: NA    Prescription for Tobacco Cessation medication: NA    If no prescriptions for Tobacco Cessation must document why: NA    Consults: Internal medicine    Significant Diagnostic Studies:     Lab Results   Component Value Date    WBC 5.3 01/13/2022    HGB 13.0 01/13/2022    HCT 38.9 01/13/2022    MCV 90.7 01/13/2022     01/13/2022     Lab Results   Component Value Date     01/13/2022    K 3.9 01/13/2022     01/13/2022    CO2 23 01/13/2022    BUN 14 01/13/2022    CREATININE 0.7 01/13/2022    GLUCOSE 95 01/13/2022    CALCIUM 9.6 01/13/2022    PROT 6.2 (L) 01/10/2022    LABALBU 3.3 (L) 01/10/2022    BILITOT 0.3 01/10/2022    ALKPHOS 62 01/10/2022    AST 17 01/10/2022    ALT 11 01/10/2022    LABGLOM >60 01/13/2022    GFRAA >59 01/13/2022       Lab Results   Component Value Date    TSHFT4 3.56 01/20/2022     Lab Results   Component Value Date    LPBGYTLG45 1137 (H) 01/20/2022     Lab Results   Component Value Date VITD25 32.1 01/20/2022       Treatments: therapies: RN and SW    Mental Status Examination:  Alert, Oriented X 4  Appearance:  Proper Grooming and Hygiene  Speech with Regular Rate and Rhythm  Eye Contact:  Good  No Psychomotor Agitation/Retardation Noted  Attitude:  Cooperative  Mood:  \"Good\"  Affective: Congruent, appropriate to the situation, with a normal range and intensity  Thought Processes:  Coherently communicated, logical and goal oriented  Thought Content:  No Suicidal Ideation, No Homicidal Ideation, No Auditory or Visual Hallucinations, No Overt Delusions  Insight:  Present  Judgement:  Normal  Memory is intact for both remote and recent  Intellectual Functioning:  Within the [address] of Knowledge:  Adequate  Attention and Concentration:  Adequate      Discharge Exam:  Please, see medical note    Disposition: home    Patient Instructions:   Current Discharge Medication List      START taking these medications    Details   venlafaxine (EFFEXOR XR) 75 MG extended release capsule Take 1 capsule by mouth daily (with breakfast)  Qty: 30 capsule, Refills: 1      docusate sodium (COLACE) 100 MG capsule Take 1 capsule by mouth 2 times daily for 10 days  Qty: 20 capsule, Refills: 0         CONTINUE these medications which have NOT CHANGED    Details   pantoprazole sodium (PROTONIX) 40 MG PACK packet Take 40 mg by mouth every morning (before breakfast)      montelukast (SINGULAIR) 10 MG tablet Take 10 mg by mouth daily as needed      levothyroxine (SYNTHROID) 150 MCG tablet Take 150 mcg by mouth Daily Take one tablet daily, except on Sundays take an extra 0.5 tablet         STOP taking these medications       buPROPion (WELLBUTRIN SR) 150 MG extended release tablet Comments:   Reason for Stopping:             Activity: activity as tolerated  Diet: regular diet  Wound Care: none needed    Follow-up with Elmira Ying provider in 2 weeks.     Time worked: More than 31 minutes    Participation: good    Electronically signed by Buck Casper MD on 1/21/2022 at 8:47 AM

## 2022-01-21 NOTE — BH NOTE
Pt slept approximately 8 hours tonight. Monitoring continues as ordered.      Electronically signed by Johan Huitron RN on 1/21/2022 at 6:39 AM

## 2022-01-21 NOTE — PLAN OF CARE
Problem: Mood - Altered:  Goal: Mood stable  Description: Mood stable  1/20/2022 2143 by Renate Howell RN  Outcome: Ongoing  1/20/2022 1122 by Priya Thompson RN  Outcome: Ongoing     Problem: Suicide risk  Goal: Provide patient with safe environment  Description: Provide patient with safe environment  1/20/2022 2143 by Renate Howell RN  Outcome: Ongoing  1/20/2022 1122 by Priya Thompson RN  Outcome: Ongoing     Problem: Altered Mood, Depressive Behavior:  Goal: Able to verbalize acceptance of life and situations over which he or she has no control  Description: Able to verbalize acceptance of life and situations over which he or she has no control  1/20/2022 2143 by Renate Howell RN  Outcome: Ongoing  1/20/2022 1449 by Andre Weaver  Outcome: Ongoing  Note:                                                                     Group Therapy Note    Date: 1/20/2022  Start Time: 1400  End Time:  1430  Number of Participants: 4    Type of Group: Cognitive Skills    Wellness Binder Information  Module Name:  Stress  Session Number:  5    Group Goal for Pt: To raise awareness of the effectiveness of diversionary coping skills    Notes:  Pt demonstrated improved awareness of the effectiveness of diversionary coping skills by actively participating in group activity. Status After Intervention:  Improved    Participation Level:  Active Listener and Interactive    Participation Quality: Appropriate and Attentive      Speech:  normal      Thought Process/Content: Logical      Affective Functioning: Congruent      Mood: anxious and depressed      Level of consciousness:  Alert and Oriented x4      Response to Learning: Able to verbalize current knowledge/experience, Able to verbalize/acknowledge new learning, and Progressing to goal      Endings: None Reported    Modes of Intervention: Education      Discipline Responsible: Psychoeducational Specialist      Signature:  Andre Weaver    1/20/2022 1237 by Nicole Verdin  Outcome: Ongoing  Note:                                                                     Group Therapy Note    Date: 1/20/2022  Start Time: 1030  End Time:  9715  Number of Participants: 5    Type of Group: Psychoeducation    Wellness Binder Information  Module Name:  Emotional Wellness  Session Number:  1    Group Goal for Pt: To raise awareness of the importance of healthy emotional expression    Notes:  Pt demonstrated improved awareness of the importance of healthy emotional expression by actively participating in group activity. Status After Intervention:  Improved    Participation Level:  Active Listener and Interactive    Participation Quality: Appropriate and Attentive      Speech:  normal      Thought Process/Content: Logical      Affective Functioning: Congruent      Mood: anxious and depressed      Level of consciousness:  Alert and Oriented x4      Response to Learning: Able to verbalize current knowledge/experience, Able to verbalize/acknowledge new learning, and Progressing to goal      Endings: None Reported    Modes of Intervention: Education      Discipline Responsible: Psychoeducational Specialist      Signature:  Nicole Verdin    1/20/2022 1122 by Oli Roque RN  Outcome: Ongoing     Problem: Falls - Risk of:  Goal: Will remain free from falls  Description: Will remain free from falls  1/20/2022 2143 by Sánchez Mcclendon RN  Outcome: Ongoing  1/20/2022 1122 by Oli Roque RN  Outcome: Ongoing  Goal: Absence of physical injury  Description: Absence of physical injury  1/20/2022 2143 by Sánchez Mcclendon RN  Outcome: Ongoing  1/20/2022 1122 by Oli Roque RN  Outcome: Ongoing     Problem: Pain:  Goal: Pain level will decrease  Description: Pain level will decrease  Outcome: Ongoing  Goal: Control of acute pain  Description: Control of acute pain  Outcome: Ongoing  Goal: Control of chronic pain  Description: Control of chronic pain  Outcome: Ongoing

## 2022-01-22 NOTE — PROGRESS NOTES
SW attempted to contact pt to follow-up with her after she discharged from the unit yesterday to see if she had any questions or concerns that needed to be addressed. SW called the contact number listed for the pt and spoke with her. Pt said she was doing well and confirmed that she didn't have any questions or concerns.

## 2022-01-22 NOTE — PROGRESS NOTES
Progress Note  Yajaira Barragan  1/21/2022 8:10 PM  Subjective:   Admit Date:   1/13/2022      CC/ADMIT DX:       Interval History:   Reviewed overnight events and nursing notes. She has no new medical issues. I have reviewed all labs/diagnostics from the last 24hrs. ROS:   I have done a 10 point ROS and all are negative, except what is mentioned in the HPI. No diet orders on file    Medications:             Objective:   Vitals: /75   Pulse 74   Temp 97.3 °F (36.3 °C) (Temporal)   Resp 18   Wt 190 lb 9.6 oz (86.5 kg)   SpO2 97%   BMI 30.76 kg/m²  No intake or output data in the 24 hours ending 01/21/22 2010  General appearance: alert and cooperative with exam  Extremities: extremities normal, atraumatic, no cyanosis or edema  Neurologic:  No obvious focal neurologic deficits. Skin: no rashes    Assessment and Plan: Active Problems:    Depression    Suicidal overdose, initial encounter [facility])  Resolved Problems:    * No resolved hospital problems. *    GERD    Hypothyroidism    Plan:  1. Continue present medication(s)   2. Follow with Psych  3. She remains medically stable. I will monitor for any changes or concerns. Discharge planning:   her home     Reviewed treatment plans with the patient and/or family.              Electronically signed by Hanny Batista MD on 1/21/2022 at 8:10 PM

## 2024-07-31 NOTE — PLAN OF CARE
Problem: Discharge Planning:  Goal: Discharged to appropriate level of care  Description: Discharged to appropriate level of care  1/21/2022 0951 by Betsey Santana RN  Outcome: Completed  1/20/2022 2143 by Cindy Schafer RN  Outcome: Ongoing     Problem: Mood - Altered:  Goal: Mood stable  Description: Mood stable  1/21/2022 0951 by Betsey Santana RN  Outcome: Completed  1/20/2022 2143 by Cindy Schafer RN  Outcome: Ongoing     Problem: Violence - Risk of, Self/Other-Directed:  Goal: Knowledge of developmental care interventions  Description: Absence of violence  1/21/2022 0951 by Betsey Santana RN  Outcome: Completed  1/20/2022 2143 by Cindy Schafer RN  Outcome: Ongoing     Problem: Suicide risk  Goal: Provide patient with safe environment  Description: Provide patient with safe environment  1/21/2022 0951 by Betsey Santana RN  Outcome: Completed  1/20/2022 2143 by Cindy Schafer RN  Outcome: Ongoing     Problem: Altered Mood, Depressive Behavior:  Goal: Able to verbalize acceptance of life and situations over which he or she has no control  Description: Able to verbalize acceptance of life and situations over which he or she has no control  1/21/2022 0951 by Betsey Santana RN  Outcome: Completed  1/20/2022 2143 by Cindy Schafer RN  Outcome: Ongoing     Problem: Falls - Risk of:  Goal: Will remain free from falls  Description: Will remain free from falls  1/21/2022 0951 by Betsey Santana RN  Outcome: Completed  1/20/2022 2143 by Cindy Schafer RN  Outcome: Ongoing  Goal: Absence of physical injury  Description: Absence of physical injury  1/21/2022 0951 by Betsey Santana RN  Outcome: Completed  1/20/2022 2143 by Cindy Schafer RN  Outcome: Ongoing     Problem: Pain:  Goal: Pain level will decrease  Description: Pain level will decrease  1/21/2022 0951 by Betsey Santana RN  Outcome: Completed  1/20/2022 2143 by Cindy Schafer RN  Outcome: Ongoing  Goal: What Type Of Note Output Would You Prefer (Optional)?: Standard Output Control of acute pain  Description: Control of acute pain  1/21/2022 0951 by Kecia King RN  Outcome: Completed  1/20/2022 2143 by Yajaira Turpin RN  Outcome: Ongoing  Goal: Control of chronic pain  Description: Control of chronic pain  1/21/2022 0951 by Kecia King RN  Outcome: Completed  1/20/2022 2143 by Yajaira Turpin RN  Outcome: Ongoing How Severe Is Your Skin Lesion?: mild Has Your Skin Lesion Been Treated?: not been treated Is This A New Presentation, Or A Follow-Up?: Skin Lesion